# Patient Record
Sex: FEMALE | Race: ASIAN | NOT HISPANIC OR LATINO
[De-identification: names, ages, dates, MRNs, and addresses within clinical notes are randomized per-mention and may not be internally consistent; named-entity substitution may affect disease eponyms.]

---

## 2019-07-23 ENCOUNTER — TRANSCRIPTION ENCOUNTER (OUTPATIENT)
Age: 29
End: 2019-07-23

## 2019-07-30 ENCOUNTER — TRANSCRIPTION ENCOUNTER (OUTPATIENT)
Age: 29
End: 2019-07-30

## 2019-10-30 ENCOUNTER — APPOINTMENT (OUTPATIENT)
Dept: OPHTHALMOLOGY | Facility: CLINIC | Age: 29
End: 2019-10-30
Payer: COMMERCIAL

## 2019-10-30 ENCOUNTER — NON-APPOINTMENT (OUTPATIENT)
Age: 29
End: 2019-10-30

## 2019-10-30 PROCEDURE — 92012 INTRM OPH EXAM EST PATIENT: CPT

## 2019-11-13 ENCOUNTER — APPOINTMENT (OUTPATIENT)
Dept: DERMATOLOGY | Facility: CLINIC | Age: 29
End: 2019-11-13

## 2019-11-13 ENCOUNTER — APPOINTMENT (OUTPATIENT)
Dept: OPHTHALMOLOGY | Facility: CLINIC | Age: 29
End: 2019-11-13

## 2019-11-18 ENCOUNTER — APPOINTMENT (OUTPATIENT)
Dept: DERMATOLOGY | Facility: CLINIC | Age: 29
End: 2019-11-18
Payer: COMMERCIAL

## 2019-11-18 VITALS
DIASTOLIC BLOOD PRESSURE: 60 MMHG | SYSTOLIC BLOOD PRESSURE: 110 MMHG | WEIGHT: 127 LBS | HEIGHT: 61 IN | BODY MASS INDEX: 23.98 KG/M2

## 2019-11-18 DIAGNOSIS — Z77.22 CONTACT WITH AND (SUSPECTED) EXPOSURE TO ENVIRONMENTAL TOBACCO SMOKE (ACUTE) (CHRONIC): ICD-10-CM

## 2019-11-18 DIAGNOSIS — Z87.2 PERSONAL HISTORY OF DISEASES OF THE SKIN AND SUBCUTANEOUS TISSUE: ICD-10-CM

## 2019-11-18 PROCEDURE — 99203 OFFICE O/P NEW LOW 30 MIN: CPT

## 2019-11-25 ENCOUNTER — MEDICATION RENEWAL (OUTPATIENT)
Age: 29
End: 2019-11-25

## 2019-11-26 ENCOUNTER — APPOINTMENT (OUTPATIENT)
Dept: DERMATOLOGY | Facility: CLINIC | Age: 29
End: 2019-11-26

## 2019-12-23 ENCOUNTER — APPOINTMENT (OUTPATIENT)
Dept: DERMATOLOGY | Facility: CLINIC | Age: 29
End: 2019-12-23

## 2020-01-15 ENCOUNTER — TRANSCRIPTION ENCOUNTER (OUTPATIENT)
Age: 30
End: 2020-01-15

## 2020-01-17 ENCOUNTER — APPOINTMENT (OUTPATIENT)
Dept: PEDIATRIC ALLERGY IMMUNOLOGY | Facility: CLINIC | Age: 30
End: 2020-01-17

## 2020-04-14 ENCOUNTER — APPOINTMENT (OUTPATIENT)
Dept: DERMATOLOGY | Facility: CLINIC | Age: 30
End: 2020-04-14
Payer: COMMERCIAL

## 2020-04-14 PROCEDURE — 99214 OFFICE O/P EST MOD 30 MIN: CPT | Mod: 95

## 2020-04-26 ENCOUNTER — MESSAGE (OUTPATIENT)
Age: 30
End: 2020-04-26

## 2020-05-05 ENCOUNTER — APPOINTMENT (OUTPATIENT)
Dept: DERMATOLOGY | Facility: CLINIC | Age: 30
End: 2020-05-05
Payer: COMMERCIAL

## 2020-05-05 DIAGNOSIS — L81.0 POSTINFLAMMATORY HYPERPIGMENTATION: ICD-10-CM

## 2020-05-05 DIAGNOSIS — Z76.89 PERSONS ENCOUNTERING HEALTH SERVICES IN OTHER SPECIFIED CIRCUMSTANCES: ICD-10-CM

## 2020-05-05 PROCEDURE — 99214 OFFICE O/P EST MOD 30 MIN: CPT | Mod: 95

## 2021-11-17 ENCOUNTER — APPOINTMENT (OUTPATIENT)
Dept: DERMATOLOGY | Facility: CLINIC | Age: 31
End: 2021-11-17
Payer: COMMERCIAL

## 2021-11-17 VITALS — BODY MASS INDEX: 24.55 KG/M2 | HEIGHT: 61 IN | WEIGHT: 130 LBS

## 2021-11-17 DIAGNOSIS — L20.9 ATOPIC DERMATITIS, UNSPECIFIED: ICD-10-CM

## 2021-11-17 DIAGNOSIS — K13.0 DISEASES OF LIPS: ICD-10-CM

## 2021-11-17 DIAGNOSIS — L73.0 ACNE KELOID: ICD-10-CM

## 2021-11-17 DIAGNOSIS — L70.0 ACNE VULGARIS: ICD-10-CM

## 2021-11-17 PROCEDURE — 99214 OFFICE O/P EST MOD 30 MIN: CPT

## 2021-12-09 ENCOUNTER — RESULT REVIEW (OUTPATIENT)
Age: 31
End: 2021-12-09

## 2022-09-30 ENCOUNTER — APPOINTMENT (OUTPATIENT)
Dept: DERMATOLOGY | Facility: CLINIC | Age: 32
End: 2022-09-30

## 2023-06-09 ENCOUNTER — APPOINTMENT (OUTPATIENT)
Dept: ANTEPARTUM | Facility: CLINIC | Age: 33
End: 2023-06-09
Payer: COMMERCIAL

## 2023-06-09 ENCOUNTER — ASOB RESULT (OUTPATIENT)
Age: 33
End: 2023-06-09

## 2023-06-09 PROCEDURE — 76811 OB US DETAILED SNGL FETUS: CPT

## 2023-10-22 ENCOUNTER — INPATIENT (INPATIENT)
Facility: HOSPITAL | Age: 33
LOS: 2 days | Discharge: HOME | End: 2023-10-25
Attending: OBSTETRICS & GYNECOLOGY | Admitting: OBSTETRICS & GYNECOLOGY
Payer: COMMERCIAL

## 2023-10-22 VITALS — HEART RATE: 87 BPM | OXYGEN SATURATION: 96 %

## 2023-10-22 DIAGNOSIS — O26.899 OTHER SPECIFIED PREGNANCY RELATED CONDITIONS, UNSPECIFIED TRIMESTER: ICD-10-CM

## 2023-10-22 DIAGNOSIS — Z3A.40 40 WEEKS GESTATION OF PREGNANCY: ICD-10-CM

## 2023-10-22 DIAGNOSIS — Z34.80 ENCOUNTER FOR SUPERVISION OF OTHER NORMAL PREGNANCY, UNSPECIFIED TRIMESTER: ICD-10-CM

## 2023-10-22 LAB
BASOPHILS # BLD AUTO: 0.02 K/UL — SIGNIFICANT CHANGE UP (ref 0–0.2)
BASOPHILS # BLD AUTO: 0.02 K/UL — SIGNIFICANT CHANGE UP (ref 0–0.2)
BASOPHILS NFR BLD AUTO: 0.2 % — SIGNIFICANT CHANGE UP (ref 0–2)
BASOPHILS NFR BLD AUTO: 0.2 % — SIGNIFICANT CHANGE UP (ref 0–2)
BLD GP AB SCN SERPL QL: NEGATIVE — SIGNIFICANT CHANGE UP
BLD GP AB SCN SERPL QL: NEGATIVE — SIGNIFICANT CHANGE UP
EOSINOPHIL # BLD AUTO: 0.17 K/UL — SIGNIFICANT CHANGE UP (ref 0–0.5)
EOSINOPHIL # BLD AUTO: 0.17 K/UL — SIGNIFICANT CHANGE UP (ref 0–0.5)
EOSINOPHIL NFR BLD AUTO: 1.9 % — SIGNIFICANT CHANGE UP (ref 0–6)
EOSINOPHIL NFR BLD AUTO: 1.9 % — SIGNIFICANT CHANGE UP (ref 0–6)
HCT VFR BLD CALC: 36.6 % — SIGNIFICANT CHANGE UP (ref 34.5–45)
HCT VFR BLD CALC: 36.6 % — SIGNIFICANT CHANGE UP (ref 34.5–45)
HGB BLD-MCNC: 12.5 G/DL — SIGNIFICANT CHANGE UP (ref 11.5–15.5)
HGB BLD-MCNC: 12.5 G/DL — SIGNIFICANT CHANGE UP (ref 11.5–15.5)
IMM GRANULOCYTES NFR BLD AUTO: 0.8 % — SIGNIFICANT CHANGE UP (ref 0–0.9)
IMM GRANULOCYTES NFR BLD AUTO: 0.8 % — SIGNIFICANT CHANGE UP (ref 0–0.9)
LYMPHOCYTES # BLD AUTO: 1.82 K/UL — SIGNIFICANT CHANGE UP (ref 1–3.3)
LYMPHOCYTES # BLD AUTO: 1.82 K/UL — SIGNIFICANT CHANGE UP (ref 1–3.3)
LYMPHOCYTES # BLD AUTO: 20.5 % — SIGNIFICANT CHANGE UP (ref 13–44)
LYMPHOCYTES # BLD AUTO: 20.5 % — SIGNIFICANT CHANGE UP (ref 13–44)
MCHC RBC-ENTMCNC: 28.7 PG — SIGNIFICANT CHANGE UP (ref 27–34)
MCHC RBC-ENTMCNC: 28.7 PG — SIGNIFICANT CHANGE UP (ref 27–34)
MCHC RBC-ENTMCNC: 34.2 GM/DL — SIGNIFICANT CHANGE UP (ref 32–36)
MCHC RBC-ENTMCNC: 34.2 GM/DL — SIGNIFICANT CHANGE UP (ref 32–36)
MCV RBC AUTO: 83.9 FL — SIGNIFICANT CHANGE UP (ref 80–100)
MCV RBC AUTO: 83.9 FL — SIGNIFICANT CHANGE UP (ref 80–100)
MONOCYTES # BLD AUTO: 0.74 K/UL — SIGNIFICANT CHANGE UP (ref 0–0.9)
MONOCYTES # BLD AUTO: 0.74 K/UL — SIGNIFICANT CHANGE UP (ref 0–0.9)
MONOCYTES NFR BLD AUTO: 8.4 % — SIGNIFICANT CHANGE UP (ref 2–14)
MONOCYTES NFR BLD AUTO: 8.4 % — SIGNIFICANT CHANGE UP (ref 2–14)
NEUTROPHILS # BLD AUTO: 6.04 K/UL — SIGNIFICANT CHANGE UP (ref 1.8–7.4)
NEUTROPHILS # BLD AUTO: 6.04 K/UL — SIGNIFICANT CHANGE UP (ref 1.8–7.4)
NEUTROPHILS NFR BLD AUTO: 68.2 % — SIGNIFICANT CHANGE UP (ref 43–77)
NEUTROPHILS NFR BLD AUTO: 68.2 % — SIGNIFICANT CHANGE UP (ref 43–77)
NRBC # BLD: 0 /100 WBCS — SIGNIFICANT CHANGE UP (ref 0–0)
NRBC # BLD: 0 /100 WBCS — SIGNIFICANT CHANGE UP (ref 0–0)
PLATELET # BLD AUTO: 213 K/UL — SIGNIFICANT CHANGE UP (ref 150–400)
PLATELET # BLD AUTO: 213 K/UL — SIGNIFICANT CHANGE UP (ref 150–400)
RBC # BLD: 4.36 M/UL — SIGNIFICANT CHANGE UP (ref 3.8–5.2)
RBC # BLD: 4.36 M/UL — SIGNIFICANT CHANGE UP (ref 3.8–5.2)
RBC # FLD: 14.6 % — HIGH (ref 10.3–14.5)
RBC # FLD: 14.6 % — HIGH (ref 10.3–14.5)
RH IG SCN BLD-IMP: POSITIVE — SIGNIFICANT CHANGE UP
RH IG SCN BLD-IMP: POSITIVE — SIGNIFICANT CHANGE UP
WBC # BLD: 8.86 K/UL — SIGNIFICANT CHANGE UP (ref 3.8–10.5)
WBC # BLD: 8.86 K/UL — SIGNIFICANT CHANGE UP (ref 3.8–10.5)
WBC # FLD AUTO: 8.86 K/UL — SIGNIFICANT CHANGE UP (ref 3.8–10.5)
WBC # FLD AUTO: 8.86 K/UL — SIGNIFICANT CHANGE UP (ref 3.8–10.5)

## 2023-10-22 RX ORDER — OXYTOCIN 10 UNIT/ML
4 VIAL (ML) INJECTION
Qty: 30 | Refills: 0 | Status: DISCONTINUED | OUTPATIENT
Start: 2023-10-22 | End: 2023-10-23

## 2023-10-22 RX ORDER — CHLORHEXIDINE GLUCONATE 213 G/1000ML
1 SOLUTION TOPICAL DAILY
Refills: 0 | Status: DISCONTINUED | OUTPATIENT
Start: 2023-10-22 | End: 2023-10-23

## 2023-10-22 RX ORDER — AMPICILLIN TRIHYDRATE 250 MG
2 CAPSULE ORAL ONCE
Refills: 0 | Status: COMPLETED | OUTPATIENT
Start: 2023-10-22 | End: 2023-10-22

## 2023-10-22 RX ORDER — CITRIC ACID/SODIUM CITRATE 300-500 MG
15 SOLUTION, ORAL ORAL EVERY 6 HOURS
Refills: 0 | Status: DISCONTINUED | OUTPATIENT
Start: 2023-10-22 | End: 2023-10-23

## 2023-10-22 RX ORDER — SODIUM CHLORIDE 9 MG/ML
1000 INJECTION, SOLUTION INTRAVENOUS
Refills: 0 | Status: DISCONTINUED | OUTPATIENT
Start: 2023-10-22 | End: 2023-10-23

## 2023-10-22 RX ORDER — AMPICILLIN TRIHYDRATE 250 MG
1 CAPSULE ORAL EVERY 4 HOURS
Refills: 0 | Status: DISCONTINUED | OUTPATIENT
Start: 2023-10-22 | End: 2023-10-23

## 2023-10-22 RX ORDER — OXYTOCIN 10 UNIT/ML
333.33 VIAL (ML) INJECTION
Qty: 20 | Refills: 0 | Status: DISCONTINUED | OUTPATIENT
Start: 2023-10-22 | End: 2023-10-23

## 2023-10-22 RX ADMIN — Medication 4 MILLIUNIT(S)/MIN: at 22:18

## 2023-10-22 RX ADMIN — Medication 200 GRAM(S): at 21:12

## 2023-10-22 NOTE — OB RN PATIENT PROFILE - FALL HARM RISK - UNIVERSAL INTERVENTIONS
Bed in lowest position, wheels locked, appropriate side rails in place/Call bell, personal items and telephone in reach/Instruct patient to call for assistance before getting out of bed or chair/Non-slip footwear when patient is out of bed/[place] to call system/Physically safe environment - no spills, clutter or unnecessary equipment/Purposeful Proactive Rounding/Room/bathroom lighting operational, light cord in reach

## 2023-10-22 NOTE — OB PROVIDER H&P - HISTORY OF PRESENT ILLNESS
34yo P 1001    @   40w 0 d    presents c/o LOF at , c/o mild contractions, denies VB has + FM      PNC: Dr Santos  PNI: uncomplicated  PNL: GBS negative      All: No Known Allergies  Meds: ASA, PNV  PMHx: Denies  PSHx: Denies  Socialhx: Denies x 3, Denies anxiety or depression  OBhx: 2018  FT    uncomplicated 4lbs 12oz  GYNhx: Denies fibroids ov cysts or STDs or abnormal pap smears    T(C): 36.6 (10-22-23 @ 19:47), Max: 36.6 (10-22-23 @ 19:47)  HR: 88 (10--23 @ 19:54) (74 - 88)  BP: 119/58 (10-22-23 @ 19:47) (119/58 - 119/58)  RR: 18 (10-22-23 @ 19:47) (18 - 18)  SpO2: 93% (10-22-23 @ 19:54) (93% - 98%)    Gen: NAD  Heart: RRR  Lungs: CTA B/L  Abdomen: Gravid, NT  Ext: no calf tenderness    NST:  TOCO:  VE:   EFW: 3400  BSUS:    A/P 34yo    @ 40w 0 d  admitted for  - Admit to L & D/labs/IVF/NPO  - Fetal status -   - GBS   - Pain control -   - Labor managment-  - Maternal Status -   - Covid swab ordered  - Consents to be signed  D/W    Jovanna Santillan PA-C     34yo P 1001  @  40w 0 d   presents c/o LOF at 5p , c/o mild contractions, denies VB has + FM    PNC: Dr Santos  PNI: uncomplicated  PNL: GBS negative      All: No Known Allergies  Meds: ASA, PNV  PMHx: Denies  PSHx: Denies  Socialhx: Denies x 3, Denies anxiety or depression  OBhx: 2018  FT    uncomplicated 4lbs 12oz  GYNhx: Denies fibroids, ov cysts or STDs or abnormal pap smears    T(C): 36.6 (10-22-23 @ 19:47), Max: 36.6 (10-22-23 @ 19:47)  HR: 88 (10-22-23 @ 19:54) (74 - 88)  BP: 119/58 (10-22-23 @ 19:47) (119/58 - 119/58)  RR: 18 (10-22-23 @ 19:47) (18 - 18)  SpO2: 93% (10-22-23 @ 19:54) (93% - 98%)    Gen: NAD  Heart: RRR  Lungs: CTA B/L  Abdomen: Gravid, NT  Ext: no calf tenderness    NST: 150's moderate variability + accels + questionable decel when pt was first placed on the monitor  TOCO:occasional  VE: /-3, grossly ruptured - clear,  nitrazine +  EFW: 3400  BSUS: vtx         32yo P 1001  @  40w 0 d   presents c/o LOF at 5p , c/o mild contractions, denies VB has + FM    PNC: Dr Santos  PNI: uncomplicated  PNL: GBS positive      All: No Known Allergies  Meds: ASA, PNV  PMHx: Denies  PSHx: Denies  Socialhx: Denies x 3, Denies anxiety or depression  OBhx: 2018  FT    uncomplicated 4lbs 12oz  GYNhx: Denies fibroids, ov cysts or STDs or abnormal pap smears    T(C): 36.6 (10-22-23 @ 19:47), Max: 36.6 (10-22-23 @ 19:47)  HR: 88 (10-22-23 @ 19:54) (74 - 88)  BP: 119/58 (10-22-23 @ 19:47) (119/58 - 119/58)  RR: 18 (10-22-23 @ 19:47) (18 - 18)  SpO2: 93% (10-22-23 @ 19:54) (93% - 98%)    Gen: NAD  Heart: RRR  Lungs: CTA B/L  Abdomen: Gravid, NT  Ext: no calf tenderness    NST: 150's moderate variability + accels + questionable decel when pt was first placed on the monitor  TOCO:occasional  VE: /-3, grossly ruptured - clear,  nitrazine +  EFW: 3400  BSUS: vtx

## 2023-10-22 NOTE — OB PROVIDER H&P - NSICDXNOPASTSURGICALHX_GEN_ALL_CORE
EMMA Bolanos Nurse Msg Pool  [facility] PER WEB STATES THAT CPT CODES 37498 AND 18175 HAVE NOT MET CRITERIA AND HAVE BEEN DENIED. A PEER TO PEER CAN BE SCHEDULED BY CALLING [phone] USING REF # [id number] OR AN IN HOME STUDY CAN BE ORDERED.     Please let me know what the provider decides to do.     Thank you,   Gabrielle/Diego      <-- Click to add NO significant Past Surgical History

## 2023-10-22 NOTE — PRE-ANESTHESIA EVALUATION ADULT - NSANTHPMHFT_GEN_ALL_CORE
34 yo  @ 40w 0 d  admitted for prom for IOL, now in labor. Requesting an epidural for labor analgesia.    Denies active CP/SOB/Orthopnea/easy bleeding/easy bruising

## 2023-10-22 NOTE — OB PROVIDER H&P - NSLOWPPHRISK_OBGYN_A_OB
No previous uterine incision/Alex Pregnancy/Less than or equal to 4 previous vaginal births/No known bleeding disorder/No history of postpartum hemorrhage

## 2023-10-22 NOTE — OB PROVIDER H&P - NS ATTEND AMEND GEN_ALL_CORE FT
Patient seen. P1 here PROM. GBS positive. Uncomplicated pregnancy. Now comfortable with epidural    Cat I tracing   TOCO: 2 in ten     EFW 7-11lb    Plan:  As above  Continue pitocin  Consents signed. Risks reviewed for operative, vaginal and  section.   Consents signed for circumcision. Risks reviewed including bleeding infection, damage to penis, need for future surgery.   Anticipate     Sarah Jackson DO

## 2023-10-22 NOTE — OB PROVIDER H&P - PROBLEM SELECTOR PLAN 1
- Admit to L & D/labs/IVF/clears  - Fetal status - NST/TOCO  - GBS negative  - Pain control - as needed  - Labor management- will start with pitocin augmentation  - Consents to be signed  D/W Dr Renetta Santillan PA-C - Admit to L & D/labs/IVF/clears  - Fetal status - NST/TOCO  - GBS positive --> ampicillin prophylaxis  - Pain control - as needed  - Labor management- will start with pitocin augmentation  - Consents to be signed  D/W Dr Renetta Santillan PA-C

## 2023-10-23 ENCOUNTER — TRANSCRIPTION ENCOUNTER (OUTPATIENT)
Age: 33
End: 2023-10-23

## 2023-10-23 LAB
T PALLIDUM AB TITR SER: NEGATIVE — SIGNIFICANT CHANGE UP
T PALLIDUM AB TITR SER: NEGATIVE — SIGNIFICANT CHANGE UP

## 2023-10-23 RX ORDER — BENZOCAINE 10 %
1 GEL (GRAM) MUCOUS MEMBRANE EVERY 6 HOURS
Refills: 0 | Status: DISCONTINUED | OUTPATIENT
Start: 2023-10-23 | End: 2023-10-25

## 2023-10-23 RX ORDER — PRAMOXINE HYDROCHLORIDE 150 MG/15G
1 AEROSOL, FOAM RECTAL EVERY 4 HOURS
Refills: 0 | Status: DISCONTINUED | OUTPATIENT
Start: 2023-10-23 | End: 2023-10-25

## 2023-10-23 RX ORDER — OXYTOCIN 10 UNIT/ML
41.67 VIAL (ML) INJECTION
Qty: 20 | Refills: 0 | Status: DISCONTINUED | OUTPATIENT
Start: 2023-10-23 | End: 2023-10-25

## 2023-10-23 RX ORDER — ACETAMINOPHEN 500 MG
975 TABLET ORAL
Refills: 0 | Status: DISCONTINUED | OUTPATIENT
Start: 2023-10-23 | End: 2023-10-25

## 2023-10-23 RX ORDER — SODIUM CHLORIDE 9 MG/ML
500 INJECTION, SOLUTION INTRAVENOUS ONCE
Refills: 0 | Status: COMPLETED | OUTPATIENT
Start: 2023-10-23 | End: 2023-10-23

## 2023-10-23 RX ORDER — SIMETHICONE 80 MG/1
80 TABLET, CHEWABLE ORAL EVERY 4 HOURS
Refills: 0 | Status: DISCONTINUED | OUTPATIENT
Start: 2023-10-23 | End: 2023-10-25

## 2023-10-23 RX ORDER — OXYTOCIN 10 UNIT/ML
1 VIAL (ML) INJECTION
Qty: 30 | Refills: 0 | Status: DISCONTINUED | OUTPATIENT
Start: 2023-10-23 | End: 2023-10-23

## 2023-10-23 RX ORDER — IBUPROFEN 200 MG
600 TABLET ORAL EVERY 6 HOURS
Refills: 0 | Status: COMPLETED | OUTPATIENT
Start: 2023-10-23 | End: 2024-09-20

## 2023-10-23 RX ORDER — KETOROLAC TROMETHAMINE 30 MG/ML
30 SYRINGE (ML) INJECTION ONCE
Refills: 0 | Status: DISCONTINUED | OUTPATIENT
Start: 2023-10-23 | End: 2023-10-25

## 2023-10-23 RX ORDER — IBUPROFEN 200 MG
600 TABLET ORAL EVERY 6 HOURS
Refills: 0 | Status: DISCONTINUED | OUTPATIENT
Start: 2023-10-23 | End: 2023-10-25

## 2023-10-23 RX ORDER — AER TRAVELER 0.5 G/1
1 SOLUTION RECTAL; TOPICAL EVERY 4 HOURS
Refills: 0 | Status: DISCONTINUED | OUTPATIENT
Start: 2023-10-23 | End: 2023-10-25

## 2023-10-23 RX ORDER — OXYCODONE HYDROCHLORIDE 5 MG/1
5 TABLET ORAL ONCE
Refills: 0 | Status: DISCONTINUED | OUTPATIENT
Start: 2023-10-23 | End: 2023-10-25

## 2023-10-23 RX ORDER — OXYCODONE HYDROCHLORIDE 5 MG/1
5 TABLET ORAL
Refills: 0 | Status: DISCONTINUED | OUTPATIENT
Start: 2023-10-23 | End: 2023-10-25

## 2023-10-23 RX ORDER — SODIUM CHLORIDE 9 MG/ML
3 INJECTION INTRAMUSCULAR; INTRAVENOUS; SUBCUTANEOUS EVERY 8 HOURS
Refills: 0 | Status: DISCONTINUED | OUTPATIENT
Start: 2023-10-23 | End: 2023-10-25

## 2023-10-23 RX ORDER — OXYTOCIN 10 UNIT/ML
VIAL (ML) INJECTION
Qty: 30 | Refills: 0 | Status: DISCONTINUED | OUTPATIENT
Start: 2023-10-23 | End: 2023-10-23

## 2023-10-23 RX ORDER — MAGNESIUM HYDROXIDE 400 MG/1
30 TABLET, CHEWABLE ORAL
Refills: 0 | Status: DISCONTINUED | OUTPATIENT
Start: 2023-10-23 | End: 2023-10-25

## 2023-10-23 RX ORDER — HYDROCORTISONE 1 %
1 OINTMENT (GRAM) TOPICAL EVERY 6 HOURS
Refills: 0 | Status: DISCONTINUED | OUTPATIENT
Start: 2023-10-23 | End: 2023-10-25

## 2023-10-23 RX ORDER — DIPHENHYDRAMINE HCL 50 MG
25 CAPSULE ORAL EVERY 6 HOURS
Refills: 0 | Status: DISCONTINUED | OUTPATIENT
Start: 2023-10-23 | End: 2023-10-25

## 2023-10-23 RX ORDER — LANOLIN
1 OINTMENT (GRAM) TOPICAL EVERY 6 HOURS
Refills: 0 | Status: DISCONTINUED | OUTPATIENT
Start: 2023-10-23 | End: 2023-10-25

## 2023-10-23 RX ORDER — OXYTOCIN 10 UNIT/ML
2 VIAL (ML) INJECTION
Qty: 30 | Refills: 0 | Status: DISCONTINUED | OUTPATIENT
Start: 2023-10-23 | End: 2023-10-23

## 2023-10-23 RX ORDER — TETANUS TOXOID, REDUCED DIPHTHERIA TOXOID AND ACELLULAR PERTUSSIS VACCINE, ADSORBED 5; 2.5; 8; 8; 2.5 [IU]/.5ML; [IU]/.5ML; UG/.5ML; UG/.5ML; UG/.5ML
0.5 SUSPENSION INTRAMUSCULAR ONCE
Refills: 0 | Status: DISCONTINUED | OUTPATIENT
Start: 2023-10-23 | End: 2023-10-25

## 2023-10-23 RX ORDER — DIBUCAINE 1 %
1 OINTMENT (GRAM) RECTAL EVERY 6 HOURS
Refills: 0 | Status: DISCONTINUED | OUTPATIENT
Start: 2023-10-23 | End: 2023-10-25

## 2023-10-23 RX ADMIN — SODIUM CHLORIDE 500 MILLILITER(S): 9 INJECTION, SOLUTION INTRAVENOUS at 00:29

## 2023-10-23 RX ADMIN — SODIUM CHLORIDE 1000 MILLILITER(S): 9 INJECTION, SOLUTION INTRAVENOUS at 06:20

## 2023-10-23 RX ADMIN — Medication 975 MILLIGRAM(S): at 18:36

## 2023-10-23 RX ADMIN — Medication 600 MILLIGRAM(S): at 22:00

## 2023-10-23 RX ADMIN — Medication 108 GRAM(S): at 09:55

## 2023-10-23 RX ADMIN — Medication 975 MILLIGRAM(S): at 18:05

## 2023-10-23 RX ADMIN — Medication 600 MILLIGRAM(S): at 21:01

## 2023-10-23 RX ADMIN — Medication 2 MILLIUNIT(S)/MIN: at 01:20

## 2023-10-23 RX ADMIN — Medication 0.25 MILLIGRAM(S): at 00:33

## 2023-10-23 RX ADMIN — Medication 108 GRAM(S): at 05:34

## 2023-10-23 RX ADMIN — Medication 1 TABLET(S): at 21:01

## 2023-10-23 RX ADMIN — Medication 108 GRAM(S): at 01:17

## 2023-10-23 NOTE — DISCHARGE NOTE OB - CARE PROVIDER_API CALL
Helen Leon  Obstetrics and Gynecology  [address], [address]  [place] [address]  Phone: [phone]  Fax: [phone]  Follow Up Time:

## 2023-10-23 NOTE — OB PROVIDER DELIVERY SUMMARY - NSPROVIDERDELIVERYNOTE_OBGYN_ALL_OB_FT
Pt pushed with good effort. Delivered fetal head and then shoulder dystocia lasting slightly over 1 minute. Suprapubic and andree done first and then posterior arm was delivered and the rest of the body delivered. Peds already at delivery due to tracing while pushing. Placenta delivered shortly afterwards spontaneously and intact. 3rd degree laceration noted and mm repaired with 2-0 vicryl and then remaining laceration repaired with 2-0 chromic in usual fashion. Bilateral labial laceration repaired with 3-0 chromic in interrupted fashion.     Helen Leon, DO Pt pushed with good effort. Delivered fetal head and then shoulder dystocia lasting slightly over 1 minute. Suprapubic and andree done first and then posterior arm was delivered and the rest of the body delivered. Peds already at delivery due to tracing while pushing. Placenta delivered shortly afterwards spontaneously and intact. 3rd degree laceration noted and mm repaired with 2-0 vicryl and then remaining laceration repaired with 2-0 chromic in usual fashion. Bilateral labial laceration repaired with 3-0 chromic in interrupted fashion. Rectal exam performed at conclusion and no sutures palpated.     Helen Paniccia, DO

## 2023-10-23 NOTE — DISCHARGE NOTE OB - HOSPITAL COURSE
Pt was admitted for IOL d/t PROM. She had vaginal delivery complicated by shoulder dystocia. Postpartum course was uncomplicated and pt discharged on PPD Pt was admitted for IOL d/t PROM. She had vaginal delivery complicated by shoulder dystocia. Postpartum course was uncomplicated and pt discharged on PPD 2

## 2023-10-23 NOTE — OB PROVIDER DELIVERY SUMMARY - DELIVERY COMPLICATIONS; SHOULDER DYSTOCIA
Pt pushed with good effort. RML performed. Delivered fetal head and then shoulder dystocia lasting slightly over 1 minute. Suprapubic and andree done first and then posterior arm was delivered and the rest of the body delivered. Peds already at delivery due to tracing while pushing.

## 2023-10-23 NOTE — OB PROVIDER LABOR PROGRESS NOTE - NS_OBIHIFHRDETAILS_OBGYN_ALL_OB_FT
cat 1
140's moderate variability + accels + prolonged decel x 7 min to 90's, with recovery after pitocin stopped and terb given
150 minimal variability, -acels, + prolonged deceleration/ late decelerations, category 2 tracing
FHR: 150s, Mod-min variability, +accel, + intermittent late decel  TOCO: Q2-4min

## 2023-10-23 NOTE — DISCHARGE NOTE OB - PATIENT PORTAL LINK FT
You can access the FollowMyHealth Patient Portal offered by [facility] by registering at the following website: [email or web address]. By joining [facility]’s FollowMyHealth portal, you will also be able to view your health information using other applications (apps) compatible with our system.

## 2023-10-23 NOTE — DISCHARGE NOTE OB - CARE PLAN
Principal Discharge DX:	Vaginal delivery  Assessment and plan of treatment:	routine postpartum care   1 Principal Discharge DX:	Vaginal delivery  Assessment and plan of treatment:	After discharge, please stay on pelvic rest for 6 weeks, meaning no sexual intercourse, no tampons and no douching.  No driving for 2 weeks as women can loose a lot of blood during delivery and there is a possibility of being lightheaded/fainting.  No lifting objects heavier than baby for two weeks.  Expect to have vaginal bleeding/spotting for up to six weeks.  The bleeding should get lighter and more white/light brown with time.  For bleeding soaking more than a pad an hour or passing clots greater than the size of your fist, come in to the emergency department.

## 2023-10-23 NOTE — DISCHARGE NOTE OB - MEDICATION SUMMARY - MEDICATIONS TO TAKE
I will START or STAY ON the medications listed below when I get home from the hospital:    ibuprofen 600 mg oral tablet  -- 1 tab(s) by mouth every 6 hours  -- Indication: For pain    acetaminophen 325 mg oral tablet  -- 3 tab(s) by mouth every 8 hours  -- Indication: For pain    Prenatal Multivitamins with Folic Acid 1 mg oral tablet  -- 1 tab(s) by mouth once a day  -- Indication: For wellness

## 2023-10-23 NOTE — OB RN DELIVERY SUMMARY - NSSELHIDDEN_OBGYN_ALL_OB_FT
[NS_DeliveryAttending1_OBGYN_ALL_OB_FT:[id number]=],[NS_DeliveryAssist1_OBGYN_ALL_OB_FT:[id number]=],[NS_DeliveryRN_OBGYN_ALL_OB_FT:[id number]=]

## 2023-10-23 NOTE — PROVIDER CONTACT NOTE (OTHER) - SITUATION
patient complaining of cramping and notice an are protruding out above umbilicus. looks like it could be a hernia

## 2023-10-23 NOTE — DISCHARGE NOTE OB - NS MD DC FALL RISK RISK
For information on Fall & Injury Prevention, visit: [email or web address] OR  [email or web address] OR  https://www.cdc.gov/steadi/patient.html

## 2023-10-23 NOTE — PROVIDER CONTACT NOTE (OTHER) - ASSESSMENT
fundus is firm bleeding is light. Area protruding out above umbilicus is soft. patient has no pain while palpating.

## 2023-10-23 NOTE — OB NEONATOLOGY/PEDIATRICIAN DELIVERY SUMMARY - NSPEDSNEONOTESA_OBGYN_ALL_OB_FT
Requested by OB to attend this vaginal delivery at 40 weeks for NRFT.  Mother is a 33 year old, , blood type O+.  Prenatal labs as follow: HIV neg, RPR non-reactive, rubella immune, HBsA neg, GBS pos on 23. Received 4 doses of ampicillin prior to delivery. No significant maternal history. This pregnancy was uncomplicated.  ROM 10/22/23 ROM at 1700 (19hrs prior to delivery).  Clear fluids. Vertex presentation.  Code 100 reported by OB for shoulder precautions. Emerged with good tone, cried after stimulation. Dried, suctioned and stimulated on warmer. Apgars 8&9. Mom wishes to breast/bottle feed.   Consents to Hep B vaccine. Consents to circumcision. [facility] to [facility] for routine care. Parents updated. Physical exam grossly WNL.

## 2023-10-23 NOTE — OB PROVIDER DELIVERY SUMMARY - NSSELHIDDEN_OBGYN_ALL_OB_FT
[NS_DeliveryAttending1_OBGYN_ALL_OB_FT:[id number]=],[NS_DeliveryAssist1_OBGYN_ALL_OB_FT:[id number]=]

## 2023-10-23 NOTE — OB RN DELIVERY SUMMARY - NS_SEPSISRSKCALC_OBGYN_ALL_OB_FT
EOS calculated successfully. EOS Risk Factor: 0.5/1000 live births ([facility] national incidence); GA=40w1d; Temp=99.86; ROM=19.55; GBS='Positive'; Antibiotics='Broad spectrum antibiotics > 4 hrs prior to birth'

## 2023-10-23 NOTE — OB PROVIDER LABOR PROGRESS NOTE - ASSESSMENT
32yo IOL for PROM    Plan:  -continue mgmt  - EFM/TOCO    Mariaelena Bustos PGY1
Inc pitocin to 2mU and as needed so mary jo closer together provided fetus tolerates. Reviewed with pt need for ctx to be closer together. Previously fetus did not tolerate pitocin. Reviewed going slowly to try and prevent fetal distress. Reviewed however that need ctx to descend and push. Pit also placed in high fowlers.     Helen Leon, DO
32 y/o  @ 40.1 weeks admitted with PROM@5p  -Prolonged decelerations: terbutaline .25 mg, IV hydration, pitocin discontinued    Dr. Leon at the bedside  Mary Soriano NP
32yo  @ 40w 1 d admitted for IOL secondary to prom  - fetal status- cat 2--> changed position, pitocin stopped, terb x 1 and anesthesia called to low BP  - epidural in place  - continue ampicillin  pt seen at bedside with Dr Renetta Santillan PA-C

## 2023-10-24 RX ADMIN — Medication 975 MILLIGRAM(S): at 06:41

## 2023-10-24 RX ADMIN — Medication 600 MILLIGRAM(S): at 03:09

## 2023-10-24 RX ADMIN — Medication 600 MILLIGRAM(S): at 20:29

## 2023-10-24 RX ADMIN — Medication 1 TABLET(S): at 12:12

## 2023-10-24 RX ADMIN — Medication 600 MILLIGRAM(S): at 16:30

## 2023-10-24 RX ADMIN — Medication 975 MILLIGRAM(S): at 00:22

## 2023-10-24 RX ADMIN — Medication 600 MILLIGRAM(S): at 09:46

## 2023-10-24 RX ADMIN — Medication 975 MILLIGRAM(S): at 01:15

## 2023-10-24 RX ADMIN — Medication 600 MILLIGRAM(S): at 20:59

## 2023-10-24 RX ADMIN — Medication 600 MILLIGRAM(S): at 15:34

## 2023-10-24 RX ADMIN — Medication 600 MILLIGRAM(S): at 08:57

## 2023-10-24 RX ADMIN — Medication 600 MILLIGRAM(S): at 04:00

## 2023-10-24 NOTE — PROGRESS NOTE ADULT - ATTENDING COMMENTS
Pt seen on AM rounds and note reviewed    VSS AF  Fundus U-1  ext: no cords, no edema  A/P: PPD 2 s/p   D/C home  Mark Massey MD

## 2023-10-24 NOTE — PROGRESS NOTE ADULT - SUBJECTIVE AND OBJECTIVE BOX
Postpartum Note- PPD#1    Blood Type: O Positive          RPR : Negative  Rubella: Immune     S: Patient is a 34 yo  PPD#1 s/p   Patient w/o complaints, pain is controlled.    Pt is OOB, tolerating PO, passing flatus. Lochia WNL.     O:  Vital Signs Last 24 Hrs  T(C): 36.8 (24 Oct 2023 06:05), Max: 37.7 (23 Oct 2023 11:24)  T(F): 98.3 (24 Oct 2023 06:05), Max: 99.86 (23 Oct 2023 11:24)  HR: 86 (24 Oct 2023 06:05) (83 - 178)  BP: 98/65 (24 Oct 2023 06:05) (97/54 - 131/86)  BP(mean): --  RR: 18 (24 Oct 2023 06:05) (15 - 18)  SpO2: 97% (24 Oct 2023 06:05) (73% - 99%)    Parameters below as of 24 Oct 2023 06:05  Patient On (Oxygen Delivery Method): room air    Gen: NAD  Abdomen: Soft, nontender, non-distended, fundus firm.  Vaginal: Lochia WNL  Ext: Neg calf tenderness    LABS:  Hemoglobin: 12.5 g/dL (10-22 @ 21:15)  Hematocrit: 36.6 % (10- @ 21:15)

## 2023-10-25 VITALS
HEART RATE: 92 BPM | DIASTOLIC BLOOD PRESSURE: 77 MMHG | TEMPERATURE: 98 F | OXYGEN SATURATION: 98 % | SYSTOLIC BLOOD PRESSURE: 133 MMHG | RESPIRATION RATE: 18 BRPM

## 2023-10-25 PROCEDURE — 86780 TREPONEMA PALLIDUM: CPT

## 2023-10-25 PROCEDURE — 85025 COMPLETE CBC W/AUTO DIFF WBC: CPT

## 2023-10-25 PROCEDURE — 86900 BLOOD TYPING SEROLOGIC ABO: CPT

## 2023-10-25 PROCEDURE — 59050 FETAL MONITOR W/REPORT: CPT

## 2023-10-25 PROCEDURE — 86901 BLOOD TYPING SEROLOGIC RH(D): CPT

## 2023-10-25 PROCEDURE — 86850 RBC ANTIBODY SCREEN: CPT

## 2023-10-25 RX ORDER — ACETAMINOPHEN 500 MG
3 TABLET ORAL
Qty: 0 | Refills: 0 | DISCHARGE
Start: 2023-10-25

## 2023-10-25 RX ORDER — IBUPROFEN 200 MG
1 TABLET ORAL
Qty: 0 | Refills: 0 | DISCHARGE
Start: 2023-10-25

## 2023-10-25 RX ADMIN — Medication 600 MILLIGRAM(S): at 08:30

## 2023-10-25 RX ADMIN — Medication 600 MILLIGRAM(S): at 08:00

## 2023-10-25 RX ADMIN — Medication 975 MILLIGRAM(S): at 12:50

## 2023-10-25 NOTE — PROGRESS NOTE ADULT - SUBJECTIVE AND OBJECTIVE BOX
OB Attending Note    Seen at 7:50am    S: Patient doing well. Minimal lochia. Pain c/o back and perineal pain did not take meds overnight. Yesterday had good pain control with meds.    O: Vital Signs Last 24 Hrs  T(C): 36.8 (25 Oct 2023 05:54), Max: 36.9 (24 Oct 2023 16:31)  T(F): 98.2 (25 Oct 2023 05:54), Max: 98.4 (24 Oct 2023 16:31)  HR: 92 (25 Oct 2023 05:54) (92 - 92)  BP: 133/77 (25 Oct 2023 05:54) (100/62 - 133/77)  BP(mean): --  RR: 18 (25 Oct 2023 05:54) (18 - 18)  SpO2: 98% (25 Oct 2023 05:54) (95% - 98%)    Parameters below as of 25 Oct 2023 05:54  Patient On (Oxygen Delivery Method): room air        Gen: NAD  Abd: soft, NT, ND, fundus firm below umbilicus  Lochia: min  Perineum healing well  Ext: no tenderness    Labs:

## 2023-10-25 NOTE — PROGRESS NOTE ADULT - ASSESSMENT
PPD2 s/p , doing well   -Discharge home - IF PAIN IMPROVED AFTER MEDS  -Precautions given   -Follow-up in office in 6 weeks      Ashli Medina MD

## 2024-01-22 ENCOUNTER — NON-APPOINTMENT (OUTPATIENT)
Age: 34
End: 2024-01-22

## 2024-02-01 ENCOUNTER — APPOINTMENT (OUTPATIENT)
Dept: ULTRASOUND IMAGING | Facility: CLINIC | Age: 34
End: 2024-02-01
Payer: COMMERCIAL

## 2024-02-01 PROCEDURE — 76705 ECHO EXAM OF ABDOMEN: CPT

## 2024-02-27 ENCOUNTER — APPOINTMENT (OUTPATIENT)
Dept: SURGERY | Facility: CLINIC | Age: 34
End: 2024-02-27
Payer: COMMERCIAL

## 2024-02-27 ENCOUNTER — APPOINTMENT (OUTPATIENT)
Dept: DERMATOLOGY | Facility: CLINIC | Age: 34
End: 2024-02-27

## 2024-02-27 VITALS
HEIGHT: 63 IN | HEART RATE: 80 BPM | OXYGEN SATURATION: 98 % | BODY MASS INDEX: 23.04 KG/M2 | RESPIRATION RATE: 17 BRPM | WEIGHT: 130 LBS | DIASTOLIC BLOOD PRESSURE: 77 MMHG | SYSTOLIC BLOOD PRESSURE: 116 MMHG | TEMPERATURE: 97.3 F

## 2024-02-27 DIAGNOSIS — K42.9 UMBILICAL HERNIA W/OUT OBSTRUCTION OR GANGRENE: ICD-10-CM

## 2024-02-27 DIAGNOSIS — M62.08 SEPARATION OF MUSCLE (NONTRAUMATIC), OTHER SITE: ICD-10-CM

## 2024-02-27 PROCEDURE — 99203 OFFICE O/P NEW LOW 30 MIN: CPT

## 2024-02-27 RX ORDER — TRETINOIN 0.25 MG/G
0.03 CREAM TOPICAL
Qty: 1 | Refills: 6 | Status: DISCONTINUED | COMMUNITY
Start: 2020-04-14 | End: 2024-02-27

## 2024-02-27 RX ORDER — TACROLIMUS 1 MG/G
0.1 OINTMENT TOPICAL
Qty: 2 | Refills: 2 | Status: DISCONTINUED | COMMUNITY
Start: 2019-11-18 | End: 2024-02-27

## 2024-02-27 RX ORDER — AZELAIC ACID 0.15 G/G
15 GEL TOPICAL
Qty: 1 | Refills: 5 | Status: DISCONTINUED | COMMUNITY
Start: 2020-04-14 | End: 2024-02-27

## 2024-02-27 RX ORDER — KETOCONAZOLE 20 MG/G
2 CREAM TOPICAL
Qty: 1 | Refills: 2 | Status: DISCONTINUED | COMMUNITY
Start: 2020-05-05 | End: 2024-02-27

## 2024-02-27 RX ORDER — TRIAMCINOLONE ACETONIDE 1 MG/G
0.1 OINTMENT TOPICAL
Qty: 1 | Refills: 0 | Status: DISCONTINUED | COMMUNITY
Start: 2019-11-18 | End: 2024-02-27

## 2024-02-27 RX ORDER — MULTIVITAMIN
TABLET ORAL
Refills: 0 | Status: ACTIVE | COMMUNITY

## 2024-02-27 NOTE — PHYSICAL EXAM
[Normal Breath Sounds] : Normal breath sounds [Normal Heart Sounds] : normal heart sounds [Alert] : alert [Oriented to Person] : oriented to person [Oriented to Place] : oriented to place [Oriented to Time] : oriented to time [Calm] : calm [de-identified] : WNL [de-identified] : WNL [de-identified] : VINL [de-identified] : Soft, nontender, moderate diastases recti and small nontender fat-containing reducible umbilical hernia. [de-identified] : WNL [de-identified] : WNL ROM

## 2024-02-27 NOTE — ASSESSMENT
[FreeTextEntry1] : In summary the patient is 16 weeks postpartum following vaginal delivery of her second child.  She has an asymptomatic moderate diastases recti and an asymptomatic fat-containing umbilical hernia.  I reassured her that neither of these requires surgery unless they are causing discomfort or be or if her umbilical hernia is getting larger.  She is considering having additional children.  I therefore explained that I would not recommend surgery for either her diastases or her umbilical hernia until she has completed childbearing and only if her umbilical hernia is symptomatic.  I explained that diastases repair typically involves plastic surgery for abdominoplasty.  Either way I reassured her that there is no indication for surgery at the present time.  She will follow-up with me as needed.

## 2024-02-27 NOTE — HISTORY OF PRESENT ILLNESS
[de-identified] : Anuja is a 34 y/o female here for consultation, ventral hernia/bulge.  Today pt reports no pain. Normal BMs, denies constipation or diarrhea. Denies nausea and vomiting. Good appetite. Not taking any anticoagulants. Does see a bulge of upper abdomen that pt noticed a month, denies increase in size of bulge, denies hearing gurgling noises, and denies discomfort from area. 16 weeks postpartum.

## 2024-03-04 ENCOUNTER — APPOINTMENT (OUTPATIENT)
Dept: DERMATOLOGY | Facility: CLINIC | Age: 34
End: 2024-03-04

## 2024-03-13 NOTE — OB RN DELIVERY SUMMARY - NS_DELIVERYRN_OBGYN_ALL_OB_FT
Addended by: SRINIVASAN BOWMAN on: 3/13/2024 10:48 AM     Modules accepted: Orders     Noemi Santana RN

## 2024-04-01 ENCOUNTER — NON-APPOINTMENT (OUTPATIENT)
Age: 34
End: 2024-04-01

## 2024-04-01 ENCOUNTER — APPOINTMENT (OUTPATIENT)
Dept: INTERNAL MEDICINE | Facility: CLINIC | Age: 34
End: 2024-04-01
Payer: COMMERCIAL

## 2024-04-01 VITALS
BODY MASS INDEX: 29.64 KG/M2 | HEIGHT: 61.02 IN | WEIGHT: 157 LBS | DIASTOLIC BLOOD PRESSURE: 80 MMHG | SYSTOLIC BLOOD PRESSURE: 115 MMHG | HEART RATE: 88 BPM | OXYGEN SATURATION: 99 %

## 2024-04-01 DIAGNOSIS — R06.83 SNORING: ICD-10-CM

## 2024-04-01 DIAGNOSIS — M54.50 LOW BACK PAIN, UNSPECIFIED: ICD-10-CM

## 2024-04-01 DIAGNOSIS — R63.5 ABNORMAL WEIGHT GAIN: ICD-10-CM

## 2024-04-01 DIAGNOSIS — B35.3 TINEA PEDIS: ICD-10-CM

## 2024-04-01 DIAGNOSIS — Z00.00 ENCOUNTER FOR GENERAL ADULT MEDICAL EXAMINATION W/OUT ABNORMAL FINDINGS: ICD-10-CM

## 2024-04-01 PROCEDURE — 93000 ELECTROCARDIOGRAM COMPLETE: CPT

## 2024-04-01 PROCEDURE — 36415 COLL VENOUS BLD VENIPUNCTURE: CPT

## 2024-04-01 PROCEDURE — 99385 PREV VISIT NEW AGE 18-39: CPT

## 2024-04-01 RX ORDER — NYSTATIN 100000 1/G
100000 POWDER TOPICAL 3 TIMES DAILY
Qty: 1 | Refills: 0 | Status: ACTIVE | COMMUNITY
Start: 2024-04-01 | End: 1900-01-01

## 2024-04-02 NOTE — ASSESSMENT
[FreeTextEntry1] : //  Lumbar pain, muscle strain -NSAIDs, such as ibuprofen, Tylenol help decrease swelling, pain, -Acupressure may be recommended to decrease pain and improve movement. Acupressure is pressure or localized massage to the area of your back pain. -Apply ice to affected area for 15 to 20 minutes every hour or as directed. Use an ice pack, or put crushed ice in a plastic bag. Cover it with a towel before you apply it to your skin. Ice helps prevent tissue damage and decreases pain. -Apply heat to affected area for 20 to 30 minutes every 2 hours for as many days as directed. Heat helps decrease pain and muscle spasms. -Stay active as much as you can without causing more pain. Bed rest could make your back pain worse. Avoid heavy lifting until your pain is gone. -Go to physical therapy as directed. A physical therapist can teach you exercises to help improve movement and strength, and to decrease pain. -Follow-up in 4-6 weeks if symptoms persist, sooner if symptoms worsen.  Snoring, likely related to weight gain from pregnancy, low likelihood of NEEL. -Advised to focus on weight loss and if symptoms continue will get sleep study  Overweight -Being overweight increases your risk of health conditions such as heart disease, high blood pressure, type 2 diabetes, and certain types of cancer. It can also increase your risk for osteoarthritis, sleep apnea, and other respiratory problems. Aim for a slow, steady weight loss. Even a small amount of weight loss can lower your risk of health problems. -A safe and healthy way to lose weight is to eat fewer calories and get regular exercise. You can lose up about 1 pound a week by decreasing the number of calories you eat by 500 calories each day. You can decrease calories by eating smaller portion sizes or by cutting out high-calorie foods. Read labels to find out how many calories are in the foods you eat. You can also burn calories with exercise such as walking, swimming, or biking. You will be more likely to keep weight off if you make these changes part of your lifestyle. -Exercise at least 30 minutes per day on most days of the week. Some examples of exercise include walking, biking, dancing, and swimming. You can also fit in more physical activity by taking the stairs instead of the elevator or parking farther away from stores.  Healthcare Maintenance -Advise Yearly Skin cancer screening with Dermatologist  -Advise Yearly Eye exam with Ophthalmologist -Advise Yearly Dental exam -Educated of the importance of Healthy diet, such as Mediterranean Diet and Exercise, such as walking >20 minutes a day and increasing gradually as tolerated  Immunizations -Flu vaccine  -Covid vaccine   Preventative screening  -advised to get PAP for cervical cancer screening -up to date

## 2024-04-02 NOTE — HISTORY OF PRESENT ILLNESS
[de-identified] : 33 year old female presents for initial annual exam.  c/o back pain for the last several months.  no radiation or associated symptoms.  better after taking nsaids.   more snoring with increased weight States this is the heaviest she has has been.  Motivated to get back to her baseline weight of around 130-140 Slight pedal edema noted Currently breast-feeding.  Not on oral contraceptive but plan on IUD  diet- more home cooking recently.  +water.  no soda, juicies or energy drinks.   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

## 2024-04-02 NOTE — HEALTH RISK ASSESSMENT
[No falls in past year] : Patient reported no falls in the past year [Good] : ~his/her~  mood as  good [0] : 1) Little interest or pleasure doing things: Not at all (0) [PHQ-2 Negative - No further assessment needed] : PHQ-2 Negative - No further assessment needed [Patient reported PAP Smear was normal] : Patient reported PAP Smear was normal [Never] : Never [Change in mental status noted] : No change in mental status noted [[id number]] : 0 [PapSmearDate] : 2023

## 2024-04-03 ENCOUNTER — CLINICAL ADVICE (OUTPATIENT)
Age: 34
End: 2024-04-03

## 2024-04-03 LAB
25(OH)D3 SERPL-MCNC: 25.9 NG/ML
ALBUMIN SERPL ELPH-MCNC: 4.3 G/DL
ALP BLD-CCNC: 79 U/L
ALT SERPL-CCNC: 21 U/L
ANION GAP SERPL CALC-SCNC: 11 MMOL/L
APPEARANCE: CLEAR
AST SERPL-CCNC: 18 U/L
BACTERIA: ABNORMAL /HPF
BASOPHILS # BLD AUTO: 0.06 K/UL
BASOPHILS NFR BLD AUTO: 0.7 %
BILIRUB DIRECT SERPL-MCNC: 0 MG/DL
BILIRUB INDIRECT SERPL-MCNC: NORMAL MG/DL
BILIRUB SERPL-MCNC: 0.2 MG/DL
BILIRUBIN URINE: NEGATIVE
BLOOD URINE: NEGATIVE
BUN SERPL-MCNC: 13 MG/DL
CALCIUM SERPL-MCNC: 9.3 MG/DL
CAST: 0 /LPF
CHLORIDE SERPL-SCNC: 102 MMOL/L
CHOLEST SERPL-MCNC: 211 MG/DL
CO2 SERPL-SCNC: 26 MMOL/L
COLOR: YELLOW
CREAT SERPL-MCNC: 0.86 MG/DL
EGFR: 90 ML/MIN/1.73M2
EOSINOPHIL # BLD AUTO: 0.48 K/UL
EOSINOPHIL NFR BLD AUTO: 5.9 %
EPITHELIAL CELLS: 4 /HPF
ESTIMATED AVERAGE GLUCOSE: 123 MG/DL
FERRITIN SERPL-MCNC: 137 NG/ML
FOLATE SERPL-MCNC: >20 NG/ML
GLUCOSE QUALITATIVE U: NEGATIVE MG/DL
GLUCOSE SERPL-MCNC: 101 MG/DL
HBA1C MFR BLD HPLC: 5.9 %
HCT VFR BLD CALC: 40 %
HDLC SERPL-MCNC: 42 MG/DL
HGB BLD-MCNC: 12.9 G/DL
IMM GRANULOCYTES NFR BLD AUTO: 0.1 %
IRON SATN MFR SERPL: 18 %
IRON SERPL-MCNC: 53 UG/DL
KETONES URINE: NEGATIVE MG/DL
LDLC SERPL CALC-MCNC: 110 MG/DL
LEUKOCYTE ESTERASE URINE: ABNORMAL
LYMPHOCYTES # BLD AUTO: 2.47 K/UL
LYMPHOCYTES NFR BLD AUTO: 30.3 %
MAN DIFF?: NORMAL
MCHC RBC-ENTMCNC: 28.4 PG
MCHC RBC-ENTMCNC: 32.3 GM/DL
MCV RBC AUTO: 87.9 FL
MICROSCOPIC-UA: NORMAL
MONOCYTES # BLD AUTO: 0.58 K/UL
MONOCYTES NFR BLD AUTO: 7.1 %
NEUTROPHILS # BLD AUTO: 4.56 K/UL
NEUTROPHILS NFR BLD AUTO: 55.9 %
NITRITE URINE: NEGATIVE
NONHDLC SERPL-MCNC: 168 MG/DL
PH URINE: 7
PLATELET # BLD AUTO: 336 K/UL
POTASSIUM SERPL-SCNC: 4.3 MMOL/L
PROT SERPL-MCNC: 7.2 G/DL
PROTEIN URINE: NEGATIVE MG/DL
RBC # BLD: 4.55 M/UL
RBC # FLD: 12.7 %
RED BLOOD CELLS URINE: 0 /HPF
SODIUM SERPL-SCNC: 139 MMOL/L
SPECIFIC GRAVITY URINE: 1.02
TIBC SERPL-MCNC: 299 UG/DL
TRIGL SERPL-MCNC: 337 MG/DL
TSH SERPL-ACNC: 1.25 UIU/ML
UIBC SERPL-MCNC: 246 UG/DL
UROBILINOGEN URINE: 0.2 MG/DL
VIT B12 SERPL-MCNC: 908 PG/ML
WBC # FLD AUTO: 8.16 K/UL
WHITE BLOOD CELLS URINE: 6 /HPF

## 2024-06-05 ENCOUNTER — APPOINTMENT (OUTPATIENT)
Dept: ORTHOPEDIC SURGERY | Facility: CLINIC | Age: 34
End: 2024-06-05
Payer: COMMERCIAL

## 2024-06-05 VITALS — HEIGHT: 61 IN | WEIGHT: 157 LBS | BODY MASS INDEX: 29.64 KG/M2

## 2024-06-05 DIAGNOSIS — M53.3 SACROCOCCYGEAL DISORDERS, NOT ELSEWHERE CLASSIFIED: ICD-10-CM

## 2024-06-05 DIAGNOSIS — M62.830 MUSCLE SPASM OF BACK: ICD-10-CM

## 2024-06-05 PROCEDURE — 72110 X-RAY EXAM L-2 SPINE 4/>VWS: CPT

## 2024-06-05 PROCEDURE — 99203 OFFICE O/P NEW LOW 30 MIN: CPT

## 2024-06-05 PROCEDURE — 72170 X-RAY EXAM OF PELVIS: CPT

## 2024-06-05 PROCEDURE — 72200 X-RAY EXAM SI JOINTS: CPT

## 2024-06-05 NOTE — IMAGING
[AP] : anteroposterior [There are no fractures, subluxations or dislocations. No significant abnormalities are seen] : There are no fractures, subluxations or dislocations. No significant abnormalities are seen [No bony abnormalities] : No bony abnormalities [de-identified] : LSPINE Inspection: No rash or ecchymosis. No lesions in skin surrounding coccyx Palpation: TTP in coccyx. No tenderness to palpation or spasm in bilateral thoracic and lumbar paraspinal musculature, no SI joint tenderness to palpation ROM: Full with no pain Strength: nonfocal BLEs Sensation: nonfocal BLEs    [FreeTextEntry1] : minimal DDD

## 2024-06-05 NOTE — HISTORY OF PRESENT ILLNESS
[Lower back] : lower back [de-identified] : [birth year]34 y/o F presents for intermittent tailbone pain since 10/2023. Denies pain/N/T in BLEs. Denies injury, although states pain began late in her pregnancy. Pain with sitting, as well as standing from seated. Has tried heating pads, with partial relief. Denies recent physical therapy. Denies b/b dysfunction. Denies prior lower back surgeries. Patient is currently breastfeeding.   PMH: Denies [] : no [FreeTextEntry5] : No reported injury, pain has been on and off since October 2023. Patient denies numbness/tingling; pain worsened post-partum.

## 2024-06-05 NOTE — ASSESSMENT
[FreeTextEntry1] : Meds as per OB/GYN given patient is currently breastfeeding.  Advised pelvic floor PT given coccydynia following childbirth  Patient seen by Radha White PA-C, with and under the supervision of  Dr. Troy Campos M.D.

## 2024-06-13 ENCOUNTER — APPOINTMENT (OUTPATIENT)
Dept: ORTHOPEDIC SURGERY | Facility: CLINIC | Age: 34
End: 2024-06-13

## 2024-07-10 ENCOUNTER — APPOINTMENT (OUTPATIENT)
Dept: ORTHOPEDIC SURGERY | Facility: CLINIC | Age: 34
End: 2024-07-10

## 2024-10-17 NOTE — OB RN PATIENT PROFILE - ANESTHESIA, PREVIOUS REACTION, PROFILE
Detail Level: Detailed Quality 47: Advance Care Plan: Advance Care Planning discussed and documented; advance care plan or surrogate decision maker documented in the medical record. Quality 226: Preventive Care And Screening: Tobacco Use: Screening And Cessation Intervention: Patient screened for tobacco use and is an ex/non-smoker unknown

## 2024-11-20 ENCOUNTER — NON-APPOINTMENT (OUTPATIENT)
Age: 34
End: 2024-11-20

## 2025-03-04 ENCOUNTER — ASOB RESULT (OUTPATIENT)
Age: 35
End: 2025-03-04

## 2025-03-04 ENCOUNTER — APPOINTMENT (OUTPATIENT)
Dept: ANTEPARTUM | Facility: CLINIC | Age: 35
End: 2025-03-04
Payer: COMMERCIAL

## 2025-03-04 PROCEDURE — 99202 OFFICE O/P NEW SF 15 MIN: CPT | Mod: 25

## 2025-03-04 PROCEDURE — 76811 OB US DETAILED SNGL FETUS: CPT

## 2025-04-01 ENCOUNTER — APPOINTMENT (OUTPATIENT)
Dept: ANTEPARTUM | Facility: CLINIC | Age: 35
End: 2025-04-01
Payer: COMMERCIAL

## 2025-04-01 ENCOUNTER — ASOB RESULT (OUTPATIENT)
Age: 35
End: 2025-04-01

## 2025-04-01 PROCEDURE — 76816 OB US FOLLOW-UP PER FETUS: CPT

## 2025-04-27 NOTE — OB PROVIDER LABOR PROGRESS NOTE - NS_SUBJECTIVE/OBJECTIVE_OBGYN_ALL_OB_FT
Patient arrives to ED via [facility] from home complaining of abdominal and back pain x1 week rated 8/10. Says that it feels similar to previous bowel obstructions. Endorses nausea and one occurrence of vomiting yesterday.   Past Medical History:   Diagnosis Date    Chondromalacia of patella     COPD (chronic obstructive pulmonary disease)  (CMD)     Diabetes mellitus  (CMD)     borderline, no meds    Fracture     bone in back of knee, arm as child    GERD (gastroesophageal reflux disease)     H. pylori infection 07/28/2021    Dr. Bourgeois, 10 daysH Pylori treatment.    History of tobacco use     Hypertension     Hypokalemia 03/11/2021    Incidental finding on 03/10 in ED. Might be related to chronic diarrhea. Currently taking potassium supplements.      Low HDL (under 40) 03/26/2021    Prediabetes     RUQ discomfort 01/15/2022    Unspecified asthma(493.90)     Uterine fibroid        
NP Chart Note:    Prolonged deceleration for 6 minutes with multiple late decelerations noted.  The patient was noted to have a tachysystole contraction pattern Q1-2mins with rapid dilatation.  Pitocin discontinued, IV hydration, and terbutaline .25 mg administered with good recovery in FHR.  The patient was noted to be 10/100/-2
Pt examined at bedside, comfortable
Called to evaluate patient secondary to prolonged decel  Pt comfortable after epidural    T(C): 36.8 (10-22-23 @ 22:47), Max: 37.0 (10-22-23 @ 20:01)  HR: 88 (10-23-23 @ 00:33) (68 - 100)  BP: 113/59 (10-23-23 @ 00:32) (85/57 - 119/58)  RR: 18 (10-22-23 @ 22:47) (18 - 18)  SpO2: 98% (10-23-23 @ 00:33) (92% - 99%)
Pt seen at bedside feeling more pressure. Fully/-2 but -1 with ctx. No molding. Pitocin at 1mU

## 2025-06-03 ENCOUNTER — APPOINTMENT (OUTPATIENT)
Dept: INTERNAL MEDICINE | Facility: CLINIC | Age: 35
End: 2025-06-03

## 2025-07-09 ENCOUNTER — OUTPATIENT (OUTPATIENT)
Dept: OUTPATIENT SERVICES | Facility: HOSPITAL | Age: 35
LOS: 1 days | End: 2025-07-09
Payer: COMMERCIAL

## 2025-07-09 VITALS
WEIGHT: 158.95 LBS | SYSTOLIC BLOOD PRESSURE: 101 MMHG | OXYGEN SATURATION: 98 % | HEIGHT: 63 IN | DIASTOLIC BLOOD PRESSURE: 69 MMHG | HEART RATE: 104 BPM | RESPIRATION RATE: 20 BRPM | TEMPERATURE: 99 F

## 2025-07-09 DIAGNOSIS — Z34.90 ENCOUNTER FOR SUPERVISION OF NORMAL PREGNANCY, UNSPECIFIED, UNSPECIFIED TRIMESTER: ICD-10-CM

## 2025-07-09 DIAGNOSIS — Z01.818 ENCOUNTER FOR OTHER PREPROCEDURAL EXAMINATION: ICD-10-CM

## 2025-07-09 DIAGNOSIS — K08.409 PARTIAL LOSS OF TEETH, UNSPECIFIED CAUSE, UNSPECIFIED CLASS: Chronic | ICD-10-CM

## 2025-07-09 LAB
ANION GAP SERPL CALC-SCNC: 15 MMOL/L — SIGNIFICANT CHANGE UP (ref 5–17)
BLD GP AB SCN SERPL QL: NEGATIVE — SIGNIFICANT CHANGE UP
BUN SERPL-MCNC: 8 MG/DL — SIGNIFICANT CHANGE UP (ref 7–23)
CALCIUM SERPL-MCNC: 9.1 MG/DL — SIGNIFICANT CHANGE UP (ref 8.4–10.5)
CHLORIDE SERPL-SCNC: 103 MMOL/L — SIGNIFICANT CHANGE UP (ref 96–108)
CO2 SERPL-SCNC: 18 MMOL/L — LOW (ref 22–31)
CREAT SERPL-MCNC: 0.4 MG/DL — LOW (ref 0.5–1.3)
EGFR: 131 ML/MIN/1.73M2 — SIGNIFICANT CHANGE UP
EGFR: 131 ML/MIN/1.73M2 — SIGNIFICANT CHANGE UP
GLUCOSE SERPL-MCNC: 110 MG/DL — HIGH (ref 70–99)
HCT VFR BLD CALC: 37.8 % — SIGNIFICANT CHANGE UP (ref 34.5–45)
HGB BLD-MCNC: 12.4 G/DL — SIGNIFICANT CHANGE UP (ref 11.5–15.5)
MCHC RBC-ENTMCNC: 28.6 PG — SIGNIFICANT CHANGE UP (ref 27–34)
MCHC RBC-ENTMCNC: 32.8 G/DL — SIGNIFICANT CHANGE UP (ref 32–36)
MCV RBC AUTO: 87.1 FL — SIGNIFICANT CHANGE UP (ref 80–100)
NRBC # BLD AUTO: 0 K/UL — SIGNIFICANT CHANGE UP (ref 0–0)
NRBC # FLD: 0 K/UL — SIGNIFICANT CHANGE UP (ref 0–0)
NRBC BLD AUTO-RTO: 0 /100 WBCS — SIGNIFICANT CHANGE UP (ref 0–0)
PLATELET # BLD AUTO: 219 K/UL — SIGNIFICANT CHANGE UP (ref 150–400)
PMV BLD: 9.9 FL — SIGNIFICANT CHANGE UP (ref 7–13)
POTASSIUM SERPL-MCNC: 4.1 MMOL/L — SIGNIFICANT CHANGE UP (ref 3.5–5.3)
POTASSIUM SERPL-SCNC: 4.1 MMOL/L — SIGNIFICANT CHANGE UP (ref 3.5–5.3)
RBC # BLD: 4.34 M/UL — SIGNIFICANT CHANGE UP (ref 3.8–5.2)
RBC # FLD: 14.7 % — HIGH (ref 10.3–14.5)
RH IG SCN BLD-IMP: POSITIVE — SIGNIFICANT CHANGE UP
SODIUM SERPL-SCNC: 136 MMOL/L — SIGNIFICANT CHANGE UP (ref 135–145)
WBC # BLD: 8.8 K/UL — SIGNIFICANT CHANGE UP (ref 3.8–10.5)
WBC # FLD AUTO: 8.8 K/UL — SIGNIFICANT CHANGE UP (ref 3.8–10.5)

## 2025-07-09 PROCEDURE — 80048 BASIC METABOLIC PNL TOTAL CA: CPT

## 2025-07-09 PROCEDURE — 85027 COMPLETE CBC AUTOMATED: CPT

## 2025-07-09 PROCEDURE — G0463: CPT

## 2025-07-09 RX ORDER — SODIUM CHLORIDE 9 G/1000ML
1000 INJECTION, SOLUTION INTRAVENOUS
Refills: 0 | Status: DISCONTINUED | OUTPATIENT
Start: 2025-07-15 | End: 2025-07-18

## 2025-07-09 NOTE — OB PST NOTE - NSICDXPASTMEDICALHX_GEN_ALL_CORE_FT
PAST MEDICAL HISTORY:   (normal spontaneous vaginal delivery)      PAST MEDICAL HISTORY:  Currently pregnant     History of shoulder dystocia in prior pregnancy     Myalgia of pelvic floor

## 2025-07-09 NOTE — OB PST NOTE - PROBLEM SELECTOR PLAN 1
Scheduled for primary    preop instruction discussed and writing instruction given, patient verbalized understanding and teach back   ERP  ABO on admit  labs - cbc, bmp and t&s result pending

## 2025-07-09 NOTE — OB PST NOTE - NSLASTDATEVISIT_OBGYN_ALL_OB
Known Doxepin Counseling:  Patient advised that the medication is sedating and not to drive a car after taking this medication. Patient informed of potential adverse effects including but not limited to dry mouth, urinary retention, and blurry vision.  The patient verbalized understanding of the proper use and possible adverse effects of doxepin.  All of the patient's questions and concerns were addressed.

## 2025-07-09 NOTE — OB PST NOTE - ASSESSMENT
CAPRINI SCORE    AGE RELATED RISK FACTORS                                                             [ ] Age 41-60 years                                            (1 Point)  [ ] Age: 61-74 years                                           (2 Points)                 [ ] Age= 75 years                                                (3 Points)             DISEASE RELATED RISK FACTORS                                                       [ ] Edema in the lower extremities                 (1 Point)                     [ ] Varicose veins                                               (1 Point)                                 [1 ] BMI > 25 Kg/m2                                            (1 Point)                                  [ ] Serious infection (ie PNA)                            (1 Point)                     [ ] Lung disease ( COPD, Emphysema)            (1 Point)                                                                          [ ] Acute myocardial infarction                         (1 Point)                  [ ] Congestive heart failure (in the previous month)  (1 Point)         [ ] Inflammatory bowel disease                            (1 Point)                  [ ] Central venous access, PICC or Port               (2 points)       (within the last month)                                                                [ ] Stroke (in the previous month)                        (5 Points)    [ ] Previous or present malignancy                       (2 points)                                                                                                                                                         HEMATOLOGY RELATED FACTORS                                                         [ ] Prior episodes of VTE                                     (3 Points)                     [ ] Positive family history for VTE                      (3 Points)                  [ ] Prothrombin 51605 A                                     (3 Points)                     [ ] Factor V Leiden                                                (3 Points)                        [ ] Lupus anticoagulants                                      (3 Points)                                                           [ ] Anticardiolipin antibodies                              (3 Points)                                                       [ ] High homocysteine in the blood                   (3 Points)                                             [ ] Other congenital or acquired thrombophilia      (3 Points)                                                [ ] Heparin induced thrombocytopenia                  (3 Points)                                        MOBILITY RELATED FACTORS  [ ] Bed rest                                                         (1 Point)  [ ] Plaster cast                                                    (2 points)  [ ] Bed bound for more than 72 hours           (2 Points)    GENDER SPECIFIC FACTORS  [ 1] Pregnancy or had a baby within the last month   (1 Point)  [ ] Post-partum < 6 weeks                                   (1 Point)  [ ] Hormonal therapy  or oral contraception   (1 Point)  [ ] History of pregnancy complications              (1 point)  [ ] Unexplained or recurrent              (1 Point)    OTHER RISK FACTORS                                           (1 Point)  [ ] BMI >40, smoking, diabetes requiring insulin, chemotherapy  blood transfusions and length of surgery over 2 hours    SURGERY RELATED RISK FACTORS  [ ]  Section within the last month     (1 Point)  [ ] Minor surgery                                                  (1 Point)  [ ] Arthroscopic surgery                                       (2 Points)  [2 ] Planned major surgery lasting more            (2 Points)      than 45 minutes     [ ] Elective hip or knee joint replacement       (5 points)       surgery                                                TRAUMA RELATED RISK FACTORS  [ ] Fracture of the hip, pelvis, or leg                       (5 Points)  [ ] Spinal cord injury resulting in paralysis             (5 points)       (in the previous month)    [ ] Paralysis  (less than 1 month)                             (5 Points)  [ ] Multiple Trauma within 1 month                        (5 Points)    Total Score [4]    Caprini Score 0-2: Low Risk, NO VTE prophylaxis required for most patients, encourage ambulation  Caprini Score 3-6: Moderate Risk , pharmacologic VTE prophylaxis is indicated for most patients (in the absence of contraindications)  Caprini Score Greater than or =7: High risk, pharmocologic VTE prophylaxis indicated for most patients (in the absence of contraindications)

## 2025-07-09 NOTE — OB PST NOTE - HISTORY OF PRESENT ILLNESS
last sono today   pelvic floor thera;y since 2023 34 year old  presents to [facility] for scheduled primary  on 7/15/2025. last abdominal sonogram on 2025, currently 38w4d gestation, reports baby movement, no contractions. h/o pelvic floor myalgia post delivery in  (weekly therapy), shoulder dystocia in prior pregnancy.    ***on Aspirin daily

## 2025-07-15 ENCOUNTER — INPATIENT (INPATIENT)
Facility: HOSPITAL | Age: 35
LOS: 2 days | Discharge: HOME | DRG: 998 | End: 2025-07-18
Attending: OBSTETRICS & GYNECOLOGY | Admitting: OBSTETRICS & GYNECOLOGY
Payer: COMMERCIAL

## 2025-07-15 VITALS — OXYGEN SATURATION: 96 % | DIASTOLIC BLOOD PRESSURE: 58 MMHG | HEART RATE: 91 BPM | SYSTOLIC BLOOD PRESSURE: 100 MMHG

## 2025-07-15 DIAGNOSIS — K08.409 PARTIAL LOSS OF TEETH, UNSPECIFIED CAUSE, UNSPECIFIED CLASS: Chronic | ICD-10-CM

## 2025-07-15 LAB
HCT VFR BLD CALC: 34.4 % — LOW (ref 34.5–45)
HGB BLD-MCNC: 11.4 G/DL — LOW (ref 11.5–15.5)
MCHC RBC-ENTMCNC: 28.5 PG — SIGNIFICANT CHANGE UP (ref 27–34)
MCHC RBC-ENTMCNC: 33.1 G/DL — SIGNIFICANT CHANGE UP (ref 32–36)
MCV RBC AUTO: 86 FL — SIGNIFICANT CHANGE UP (ref 80–100)
NRBC # BLD AUTO: 0 K/UL — SIGNIFICANT CHANGE UP (ref 0–0)
NRBC # FLD: 0 K/UL — SIGNIFICANT CHANGE UP (ref 0–0)
NRBC BLD AUTO-RTO: 0 /100 WBCS — SIGNIFICANT CHANGE UP (ref 0–0)
PLATELET # BLD AUTO: 198 K/UL — SIGNIFICANT CHANGE UP (ref 150–400)
PMV BLD: 9.7 FL — SIGNIFICANT CHANGE UP (ref 7–13)
RBC # BLD: 4 M/UL — SIGNIFICANT CHANGE UP (ref 3.8–5.2)
RBC # FLD: 14.5 % — SIGNIFICANT CHANGE UP (ref 10.3–14.5)
WBC # BLD: 15.25 K/UL — HIGH (ref 3.8–10.5)
WBC # FLD AUTO: 15.25 K/UL — HIGH (ref 3.8–10.5)

## 2025-07-15 PROCEDURE — 85027 COMPLETE CBC AUTOMATED: CPT

## 2025-07-15 PROCEDURE — 86901 BLOOD TYPING SEROLOGIC RH(D): CPT

## 2025-07-15 PROCEDURE — 86780 TREPONEMA PALLIDUM: CPT

## 2025-07-15 PROCEDURE — 88302 TISSUE EXAM BY PATHOLOGIST: CPT | Mod: 26

## 2025-07-15 PROCEDURE — 86850 RBC ANTIBODY SCREEN: CPT

## 2025-07-15 PROCEDURE — 86900 BLOOD TYPING SEROLOGIC ABO: CPT

## 2025-07-15 DEVICE — SURGICEL POWDER 3 GRAMS: Type: IMPLANTABLE DEVICE | Status: FUNCTIONAL

## 2025-07-15 DEVICE — SURGICEL 2 X 14": Type: IMPLANTABLE DEVICE | Status: FUNCTIONAL

## 2025-07-15 RX ORDER — IBUPROFEN 200 MG
600 TABLET ORAL EVERY 6 HOURS
Refills: 0 | Status: COMPLETED | OUTPATIENT
Start: 2025-07-15 | End: 2026-06-13

## 2025-07-15 RX ORDER — DEXAMETHASONE 0.5 MG/1
4 TABLET ORAL EVERY 6 HOURS
Refills: 0 | Status: DISCONTINUED | OUTPATIENT
Start: 2025-07-16 | End: 2025-07-18

## 2025-07-15 RX ORDER — DEXAMETHASONE 0.5 MG/1
4 TABLET ORAL EVERY 6 HOURS
Refills: 0 | Status: DISCONTINUED | OUTPATIENT
Start: 2025-07-15 | End: 2025-07-16

## 2025-07-15 RX ORDER — SODIUM CHLORIDE 9 G/1000ML
1000 INJECTION, SOLUTION INTRAVENOUS
Refills: 0 | Status: DISCONTINUED | OUTPATIENT
Start: 2025-07-15 | End: 2025-07-16

## 2025-07-15 RX ORDER — NALBUPHINE HYDROCHLORIDE 10 MG/ML
2.5 INJECTION INTRAMUSCULAR; INTRAVENOUS; SUBCUTANEOUS EVERY 6 HOURS
Refills: 0 | Status: DISCONTINUED | OUTPATIENT
Start: 2025-07-16 | End: 2025-07-18

## 2025-07-15 RX ORDER — NALBUPHINE HYDROCHLORIDE 10 MG/ML
2.5 INJECTION INTRAMUSCULAR; INTRAVENOUS; SUBCUTANEOUS EVERY 6 HOURS
Refills: 0 | Status: DISCONTINUED | OUTPATIENT
Start: 2025-07-15 | End: 2025-07-16

## 2025-07-15 RX ORDER — ONDANSETRON HCL/PF 4 MG/2 ML
4 VIAL (ML) INJECTION EVERY 6 HOURS
Refills: 0 | Status: DISCONTINUED | OUTPATIENT
Start: 2025-07-15 | End: 2025-07-16

## 2025-07-15 RX ORDER — KETOROLAC TROMETHAMINE 30 MG/ML
30 INJECTION, SOLUTION INTRAMUSCULAR; INTRAVENOUS EVERY 6 HOURS
Refills: 0 | Status: DISCONTINUED | OUTPATIENT
Start: 2025-07-15 | End: 2025-07-16

## 2025-07-15 RX ORDER — OXYTOCIN-SODIUM CHLORIDE 0.9% IV SOLN 30 UNIT/500ML 30-0.9/5 UT/ML-%
42 SOLUTION INTRAVENOUS
Qty: 30 | Refills: 0 | Status: DISCONTINUED | OUTPATIENT
Start: 2025-07-15 | End: 2025-07-18

## 2025-07-15 RX ORDER — ACETAMINOPHEN 500 MG/5ML
975 LIQUID (ML) ORAL
Refills: 0 | Status: DISCONTINUED | OUTPATIENT
Start: 2025-07-15 | End: 2025-07-18

## 2025-07-15 RX ORDER — ONDANSETRON HCL/PF 4 MG/2 ML
4 VIAL (ML) INJECTION EVERY 6 HOURS
Refills: 0 | Status: DISCONTINUED | OUTPATIENT
Start: 2025-07-16 | End: 2025-07-18

## 2025-07-15 RX ORDER — SIMETHICONE 80 MG
80 TABLET,CHEWABLE ORAL EVERY 4 HOURS
Refills: 0 | Status: DISCONTINUED | OUTPATIENT
Start: 2025-07-15 | End: 2025-07-18

## 2025-07-15 RX ORDER — OXYCODONE HYDROCHLORIDE 30 MG/1
10 TABLET ORAL
Refills: 0 | Status: DISCONTINUED | OUTPATIENT
Start: 2025-07-16 | End: 2025-07-16

## 2025-07-15 RX ORDER — OXYCODONE HYDROCHLORIDE 30 MG/1
5 TABLET ORAL
Refills: 0 | Status: DISCONTINUED | OUTPATIENT
Start: 2025-07-16 | End: 2025-07-17

## 2025-07-15 RX ORDER — OXYCODONE HYDROCHLORIDE 30 MG/1
5 TABLET ORAL
Refills: 0 | Status: COMPLETED | OUTPATIENT
Start: 2025-07-15 | End: 2025-07-22

## 2025-07-15 RX ORDER — OXYCODONE HYDROCHLORIDE 30 MG/1
5 TABLET ORAL
Refills: 0 | Status: DISCONTINUED | OUTPATIENT
Start: 2025-07-15 | End: 2025-07-16

## 2025-07-15 RX ORDER — NALOXONE HYDROCHLORIDE 0.4 MG/ML
0.1 INJECTION, SOLUTION INTRAMUSCULAR; INTRAVENOUS; SUBCUTANEOUS
Refills: 0 | Status: DISCONTINUED | OUTPATIENT
Start: 2025-07-16 | End: 2025-07-18

## 2025-07-15 RX ORDER — DIPHENHYDRAMINE HCL 12.5MG/5ML
25 ELIXIR ORAL EVERY 6 HOURS
Refills: 0 | Status: DISCONTINUED | OUTPATIENT
Start: 2025-07-15 | End: 2025-07-18

## 2025-07-15 RX ORDER — MODIFIED LANOLIN 100 %
1 CREAM (GRAM) TOPICAL EVERY 6 HOURS
Refills: 0 | Status: DISCONTINUED | OUTPATIENT
Start: 2025-07-15 | End: 2025-07-18

## 2025-07-15 RX ORDER — MAGNESIUM HYDROXIDE 400 MG/5ML
30 SUSPENSION ORAL
Refills: 0 | Status: DISCONTINUED | OUTPATIENT
Start: 2025-07-15 | End: 2025-07-18

## 2025-07-15 RX ORDER — CITRIC ACID/SODIUM CITRATE 300-500 MG
15 SOLUTION, ORAL ORAL ONCE
Refills: 0 | Status: COMPLETED | OUTPATIENT
Start: 2025-07-15 | End: 2025-07-15

## 2025-07-15 RX ORDER — CEFAZOLIN SODIUM IN 0.9 % NACL 3 G/100 ML
2000 INTRAVENOUS SOLUTION, PIGGYBACK (ML) INTRAVENOUS ONCE
Refills: 0 | Status: COMPLETED | OUTPATIENT
Start: 2025-07-15 | End: 2025-07-15

## 2025-07-15 RX ORDER — NALOXONE HYDROCHLORIDE 0.4 MG/ML
0.1 INJECTION, SOLUTION INTRAMUSCULAR; INTRAVENOUS; SUBCUTANEOUS
Refills: 0 | Status: DISCONTINUED | OUTPATIENT
Start: 2025-07-15 | End: 2025-07-16

## 2025-07-15 RX ORDER — OXYCODONE HYDROCHLORIDE 30 MG/1
5 TABLET ORAL ONCE
Refills: 0 | Status: DISCONTINUED | OUTPATIENT
Start: 2025-07-15 | End: 2025-07-18

## 2025-07-15 RX ADMIN — SODIUM CHLORIDE 200 MILLILITER(S): 9 INJECTION, SOLUTION INTRAVENOUS at 12:23

## 2025-07-15 RX ADMIN — Medication 15 MILLILITER(S): at 13:02

## 2025-07-15 RX ADMIN — Medication 20 MILLIGRAM(S): at 13:02

## 2025-07-15 RX ADMIN — Medication 1 APPLICATION(S): at 12:30

## 2025-07-15 NOTE — OB PROVIDER H&P - ATTENDING COMMENTS
Pt here for primary elective  delivery and bilateral salpingectomy. Reviewed risks of procedure including bleeding, infection and damage to the surrounding structures and subsequent need for repair of these structures. Reviewed salpingectomy and that this is irreversible and pt confirms she would like permanent sterilization.     Helen Leon, DO

## 2025-07-15 NOTE — PRE-ANESTHESIA EVALUATION ADULT - NSANTHPMHFT_GEN_ALL_CORE
as above;   2018, epidural w/o cx's    epidural cx'd by shoulder dystocia  s/p odontectomy  chronic pelvic floor pain rx'd w/ PT  denies other siginf. PMH/PSH

## 2025-07-15 NOTE — OB RN PATIENT PROFILE - NSGBSSTATUS_OBGYN_ALL_OB
[facility]  Office: [address], DO, Kat Jensen, DO, Thang Garza, DO, Amy Figueroawood Blood, DO, Luz Elena Robins MD, Rosy Ivy MD, Leila Norton MD, Zuleyka Mayes MD, Quin Shields MD, Anna Montoya MD, Sandeep Rivera MD, Radha Phipps MD, Contreras Pathak DO, Danika Paz MD, Demetria Gibson DO, Heather Michael MD,  Vinod López DO, Chilo Fonseca MD, Mely Edwards MD, Sachin De La Garza MD, Lakisha Bundy MD, Nedra Huber, [facility], [facility], [facility], Ced Patel, CNP, Mendez Corea, CNS, Mary Alice Santos, CNP, Indy Wei, CNP, Chelly Zamora, CNP, Johnna Phelan, CNP, Sunday Solo, CNP, Roxana Garsia PA-C, Joaquim Essex, [facility], Steven Mckeon, CNP, Johnny Jay, CNP, Paulina Duque, CNP, Cuong Carter, CNP, Chon Rodriguez, CNP, Glenis Penn, [address] [place] [address]    Second Visit Note  For more detailed information please refer to the progress note of the day      4/17/2021    12:58 PM    Name:   Neptali Melendrez  MRN:     [id number]     Acct:      [de-identified]   Room:   2024/2024-01   Day:  0  Admit Date:  4/16/2021 10:30 PM    PCP:   No primary care provider on file. Code Status:  Full Code      Pt vitals were reviewed   New labs were reviewed   Patient was seen    Updated plan :     1. Called to bedside by nurse for patient's demands of \"wanting to see the doctor. \"  Patient refused getting her BP read, she is still c/o pain despite receiving 2 Percocets. She doesn't think we are treating her clots. - I explained that she is on Lovenox BID for the DVT/ PE. She needs to stay to get transitioned to oral anticoagulation. She needs to see Pulmonary.    - Pt encouraged to comply with nursing, brandy with vital signs. Pt is so drowsy after receiving IV Phenergan and Percocet that she fell asleep as I was talking to her. Only give IV Morphine prn for breakthrough pain when pt is awake.    systolic, given IV Hydralazine.   Vitals stable    DW nurse      Mavis Choi MD  4/17/2021  12:58 PM Positive

## 2025-07-15 NOTE — OB PROVIDER DELIVERY SUMMARY - NSSELHIDDEN_OBGYN_ALL_OB_FT
[NS_DeliveryAttending1_OBGYN_ALL_OB_FT:[email or web address]=],[NS_DeliveryRN_OBGYN_ALL_OB_FT:[email or web address]=],[NS_DeliveryAssist1_OBGYN_ALL_OB_FT:[id number]=]

## 2025-07-15 NOTE — OB PROVIDER H&P - NSHPPHYSICALEXAM_GEN_ALL_CORE
Gen: NAD  CV: clinically well perfused  Pulm: unlabored respirations  Abd: soft, NT, ND, gravid, no rebound or guarding  SVE: deferred  FHT: baseline 135, mod shaq, + accels, - decels  TOCO: >10min apart  Sono: cephalic, fundal placenta, FH visualized

## 2025-07-15 NOTE — OB PROVIDER DELIVERY SUMMARY - NSPROVIDERDELIVERYNOTE_OBGYN_ALL_OB_FT
scheduled pLTCS and bilateral salpingectomy  for h/o shoulder dystocia   Viable female infant    Hysterotomy closed in 1 layer using PDS   Surgicell powder placed atop hysterotomy  Grossly normal uterus, tubes, and ovaries  Abdomen closed in standard fashion  Pt to recovery in stable condition    Elevated EBL of 1L secondary to increased vascularity.   1cm of R fallopian tube portion remaining secondary to large vessel adjacent to that portion of fallopian tube.     EBL: 1L   IVF: 2L   UOP: 569cc    Tran Hartman, PGY2 scheduled pLTCS and bilateral salpingectomy  for h/o shoulder dystocia   Viable female infant    Hysterotomy closed in 1 layer using PDS   Surgicell powder and interceed placed atop hysterotomy  Grossly normal uterus, tubes, and ovaries  Abdomen closed in standard fashion  Pt to recovery in stable condition    Elevated EBL of 1L secondary to increased vascularity.   ~1cm of R fallopian tube portion remaining secondary to large vessel adjacent to that portion of fallopian tube.     EBL: 1L   IVF: 2L   UOP: 569cc    Tran Hartman, PGY2

## 2025-07-15 NOTE — OB RN INTRAOPERATIVE NOTE - NSSELHIDDEN_OBGYN_ALL_OB_FT
[NS_DeliveryAttending1_OBGYN_ALL_OB_FT:[email or web address]=],[NS_DeliveryRN_OBGYN_ALL_OB_FT:[email or web address]=] [NS_DeliveryAttending1_OBGYN_ALL_OB_FT:[email or web address]=],[NS_DeliveryRN_OBGYN_ALL_OB_FT:[email or web address]=],[NS_DeliveryAssist1_OBGYN_ALL_OB_FT:[id number]=]

## 2025-07-15 NOTE — OB PROVIDER H&P - PRO HIV INFANT
Recd report from Lazarus MACIEL  Assuming patient care at this time  IVF infusing per MAR   negative

## 2025-07-15 NOTE — OB RN PATIENT PROFILE - NSICDXPASTMEDICALHX_GEN_ALL_CORE_FT
PAST MEDICAL HISTORY:  Currently pregnant     History of shoulder dystocia in prior pregnancy     Myalgia of pelvic floor     Normal vaginal delivery of second pregnancy      PAST MEDICAL HISTORY:  Currently pregnant     History of shoulder dystocia in prior pregnancy     Myalgia of pelvic floor     Normal vaginal delivery of second pregnancy     Post partum depression

## 2025-07-15 NOTE — OB PROVIDER DELIVERY SUMMARY - NSPOSITIONATDELIA_OBGYN_ALL_OB
Discharge Instructions  Ambulation encouraged full weightbearing.  Walker or crutches for safety and comfort.  Dressing changes at least every other day, daily preferred.  Wash incision daily with soap and water.  Okay to sleep on stomach, side or back as desired.  If laying on side utilize bulky pillow between knees to reduce discomfort.  Follow-up with Dr. Chanel in office 2 weeks after surgery.  Call office to schedule appointment date and time.  Follow-up with primary care physician 3-6 weeks post surgery for general medical checkup.      SMOKING  Smoking before or after surgery can cause;         a.  Non-Union:  bones that do not heal together resulting in need for further surgery.  Smokers increase this risk by 16 times.       b.  Necrosis of the skin--smoking decreases blood flow to the incision and may cause the skin to die.    We want the best possible outcome for your surgery and smoking will severely compromise it.      Tips for Preventing Falls in the Home  Floors  Make floors safer by doing the following:   Remove throw rugs.  Keep floors and stairs free of clutter and cords.  Arrange furniture so there are clear pathways.  Clean up any spills right away.  Wear shoes that fit.  Bathrooms    Make bathrooms safer by doing the following:   Install grab bars in the tub or shower.  Apply nonskid strips or put a nonskid rubber mat in the tub or shower.  Sit on a bath chair to bathe.  Use bathmats with nonskid backing.  Raised Toilet Seat with or without arms as recommended   Lighting and the environment  Improve lighting in your home by doing the following:   Keep a flashlight in each room.  Put nightlights in the bedrooms, hallways, kitchen, and bathrooms.  Make sure all stairways have good lighting.  Take your time when going up and down stairs.  Move or rearrange items that you use often. This will make them easier to find or reach.  Look at your home to find any safety hazards. Especially look at doorways,  walkways, and the driveway. Remove or repair any safety problems that you find.  Be careful of uneven walking surfaces  Use your mobility device (walker or cane) as instructed    Preventing Surgical Site Infections     Hand washing reduces the risk of infection.      One risk of having surgery is an infection at the surgical cut the surgeon makes in the skin to do the operation. Surgical site infections can range from minor to severe.    What causes surgical site infections?  Germs are everywhere. They’re on your skin, in the air, and on things you touch. Many germs are good. Some are harmful. Surgical site infections occur when harmful germs enter your body through the incision in your skin. Some infections are caused by germs that are in the air or on objects. But most are caused by germs found on and in your own body.    What you can do to prevent surgical site infections    Wash your hands before and after touching your incision.  Keep your incision clean and dry.  Don’t pick at scabs. They help protect the wound.  You will be provided a special cleanser, Hibiclens.(See topic Hibiclens).  Leave the dressing (bandage) in place until you are told to remove it or change it. Change it only as directed, using clean hands.  If you smoke, stop for the longest duration possible before and after the operation. Ask your doctor about ways to quit.  After surgery, eat healthy foods.   Care for your incision as directed by your doctor or nurse.    What are the symptoms of a surgical site infection and when to call your surgeon  Call your surgeon if you have any of the following:  With increased skin redness, pain, and swelling/puffiness around the incision. Later you may notice a cloudy or greenish-yellow drainage from the incision and it may develop a foul odor. Increased soreness, pain, or tenderness at the surgical site. The incision may separate or open up. You are also likely to have a fever (Fever of 100.4°F (38°C) or  higher) , and may feel ill.  Symptoms can appear any time from hours to weeks after surgery.    HIBICLENS           Medicine for Pain  Medicines can help to block pain, decrease inflammation, and treat related problems. More than one medicine may be used to treat your pain. Medicines may be changed as you feel better, or if they cause side effects.    Medicines What they do Possible side effects       Opioids     (Morphine, Dilaudid, Tramadol, Oxycodone, Percocet, Norco)   Reduce feelings or perception of pain. Used for moderate to severe pain. Nausea, vomiting, itching, drowsiness, constipation, slowed breathing   Non-opioid NSAIDs    (aspirin, acetaminophen, Celebrex, Ibuprofen) Reduce pain chemicals at the site of pain. NSAIDs can reduce joint and soft tissue inflammation. Nausea, stomach pain, ulcers, indigestion, bleeding, kidney, and liver problems. Certain NSAIDs may increase the risk for cardiovascular disease in some people. Talk with your healthcare provider.         Anesthetics     (local, injected) include lidocaine, benzocaine, and medicines used by anesthesiologists Stop pain signals from reaching the brain by blocking feeling in the treated area Nausea, low blood pressure, fever, slowed breathing, fainting, seizures, heart attack         When to call your surgeon regarding pain management  Call your healthcare provider right away (or have a family member call) if you have:  Unrelieved pain  Side effects, including constipation or uncontrolled nausea, that interfere with daily activities  If you have extreme sleepiness or breathing problems, call 911.   Other precautions  Ask your healthcare provider or pharmacist how to get rid of your pain medicines safely when you stop using them.  Never share your pain medicines with anyone.  Store your medicines in a safe place so they can’t be stolen. If you think your medicine has been stolen or lost, tell your healthcare provider right away.    Ice  Applying  ice helps prevent swelling and reduce pain. Don't place ice directly on your skin.  Wrap a cold pack or bag of ice in a thin cloth. Place it over the injured area.  Ice for 10 minutes every 3 hours as needed. Don't ice for more than 20 minutes at a time.      Post-Hip Replacement: Heel Slides, Abduction, Adduction  The following exercises can be done in bed. Some help improve blood flow. Others help build strength. Your physical therapist (PT) or surgeon may give you special instructions. Otherwise, repeat each exercise 10 times. Do them at least 2 times each day.    Heel slides  Keep the heel of your operated leg on the bed. Then slide the heel toward your buttocks as far as you comfortably can.  Hold for 5 seconds. Then slide your heel back.       Abduction/adduction  Start with your feet slightly apart. Keeping your knee and foot pointing toward the ceiling, slowly slide your operated leg out to the side.  Slide your leg back to its starting position without crossing the midline of your body.    Discharge Instructions: Using an Incentive Spirometer  An incentive spirometer is a device that helps you do deep breathing exercises. These exercises will help you breathe better and improve the function of your lungs. The incentive spirometer provides a way for you to take an active part in your recovery.  Follow these steps to use your incentive spirometer     Inhale normally. Relax and breathe out.  Place your lips tightly around the mouthpiece.  Make sure the device is upright and not tilted.  Inhale as much air as you can through the mouthpiece (don't breathe through your nose). If you inhale too quickly, the spirometer may make a noise. If you hear this noise, inhale more slowly.  Hold your breath long enough to keep the balls (or disk) raised for at least 5 seconds.  Do this exercise 10 times every hour while you’re awake, or as often as your health care provider instructs.  If you were also taught coughing  exercises, perform them regularly as instructed.      Occiput Anterior

## 2025-07-15 NOTE — OB PROVIDER H&P - NSICDXPASTMEDICALHX_GEN_ALL_CORE_FT
PAST MEDICAL HISTORY:  Currently pregnant     History of shoulder dystocia in prior pregnancy     Myalgia of pelvic floor     Normal vaginal delivery of second pregnancy     Post partum depression

## 2025-07-15 NOTE — OB RN PATIENT PROFILE - FUNCTIONAL ASSESSMENT - DAILY ACTIVITY 6.
Addendum to previous telephone encounter by this writer.     When Maico called patient back she stated she would like to keep her appointment on 2/22/17. She did not have an appointment schedule on 2/27/17.   
4 = No assist / stand by assistance

## 2025-07-15 NOTE — OB PROVIDER H&P - ASSESSMENT
33yo  @39w3d p/f scheduled pLTCS.  - admit to L&D  - GBS pos  - EFW 3320g  - Reglan/Pepcid/Bicitra  - Routine Labs  - FHT/TOCO  - Anesthesia Consult  - for LTCS    Plan per Dr. Carolyn Dawson [facility] PGY2

## 2025-07-15 NOTE — OB PROVIDER H&P - HISTORY OF PRESENT ILLNESS
33yo  @39w3d p/f scheduled pLTCS for h/o shoulder dystocia.  PNC: unc  GBS: pos  EFW: 3320g (extrapolated from 36w US)  ObHx: 2018 FT  4#12 ?shoulder;  FT  3560g c/b shoulder and 3rd degree lac  GynHx: h/o ovarian cysts  MedHx: denies  PSHx: s/p wisdom teeth removal  PsychHx: PPD after 2nd delivery  SocialHx: denies  AllergyHx: NKDA  RxHx: PNV, ASA

## 2025-07-15 NOTE — OB RN DELIVERY SUMMARY - NS_INTRAPARTUMABXTYPE_OBGYN_ALL_OB
Patient walked is requesting left breast mammogram and ultrasound.  H/o right mastectomy for breast cancer.  Orders placed. Please process referrals. Thank you.   No antibiotics or any antibiotics < 2 hrs prior to birth

## 2025-07-15 NOTE — OB RN PREOPERATIVE CHECKLIST - ASSESSMENT, HISTORY & PHYSICAL COMPLETED AND ON MEDICAL RECORD
Assessment/Plan:    Diagnoses and all orders for this visit:    Viral illness        Discussed symptoms and exam findings. Exam benign, no signs of infection at this time requiring further prescription medication.   Rest and drink plenty of fluids. A cool mist humidifier can be helpful. If you develop a prolonged high fever, worsening cough, any new or concerning symptoms please call our office or proceed ER.     Subjective:     History provided by: father    Patient ID: Medardo Crawford is a 8 y.o. male    Was diagnosed with strep throat about 2 weeks ago on 3/18. Did not complete all doses of antibiotics then but felt better. Started with fever again yesterday max 101.9. Took last 2 doses of antibiotics. Denies any complaints at this time. No fever today.       The following portions of the patient's history were reviewed and updated as appropriate: allergies, current medications, past family history, past medical history, past social history, past surgical history, and problem list.    Review of Systems   Constitutional: Negative.    HENT: Negative.  Negative for rhinorrhea and sore throat.    Eyes: Negative.    Respiratory: Negative.  Negative for cough.    Cardiovascular: Negative.    Gastrointestinal: Negative.  Negative for abdominal pain, diarrhea and nausea.   Endocrine: Negative.    Genitourinary: Negative.  Negative for decreased urine volume.   Musculoskeletal: Negative.    Skin: Negative.  Negative for rash.   Allergic/Immunologic: Negative.    Neurological: Negative.    Hematological: Negative.    Psychiatric/Behavioral: Negative.     All other systems reviewed and are negative.    Objective:    Vitals:    04/02/24 1602   BP: 102/60   Pulse: 80   Resp: 16   Temp: 97.7 °F (36.5 °C)   Weight: 45.8 kg (101 lb)     Physical Exam  Vitals and nursing note reviewed. Exam conducted with a chaperone present (Dad in room providing history).   Constitutional:       General: He is active. He is not in acute  distress.     Appearance: Normal appearance. He is not toxic-appearing.   HENT:      Head: Normocephalic and atraumatic.      Right Ear: Tympanic membrane, ear canal and external ear normal. Tympanic membrane is not erythematous.      Left Ear: Tympanic membrane, ear canal and external ear normal. Tympanic membrane is not erythematous.      Nose: Nose normal. No congestion or rhinorrhea.      Mouth/Throat:      Mouth: Mucous membranes are moist.      Pharynx: Oropharynx is clear. Posterior oropharyngeal erythema (slight) present. No oropharyngeal exudate or pharyngeal petechiae.      Tonsils: 2+ on the right. 2+ on the left.   Eyes:      Extraocular Movements: Extraocular movements intact.      Conjunctiva/sclera: Conjunctivae normal.      Pupils: Pupils are equal, round, and reactive to light.   Cardiovascular:      Rate and Rhythm: Normal rate and regular rhythm.      Pulses: Normal pulses.      Heart sounds: Normal heart sounds. No murmur heard.  Pulmonary:      Effort: Pulmonary effort is normal.      Breath sounds: Normal breath sounds.   Abdominal:      General: Abdomen is flat. Bowel sounds are normal.      Palpations: Abdomen is soft.   Musculoskeletal:         General: Normal range of motion.      Cervical back: Normal range of motion and neck supple.   Skin:     General: Skin is warm and dry.      Capillary Refill: Capillary refill takes less than 2 seconds.   Neurological:      General: No focal deficit present.      Mental Status: He is alert.   Psychiatric:         Mood and Affect: Mood normal.         Behavior: Behavior normal.          done

## 2025-07-15 NOTE — OB RN DELIVERY SUMMARY - NS_SEPSISRSKCALC_OBGYN_ALL_OB_FT
EOS calculated successfully. EOS Risk Factor: 0.5/1000 live births ([facility] national incidence); GA=39w3d; Temp=98.4; ROM=0.017; GBS='Positive'; Antibiotics='No antibiotics or any antibiotics < 2 hrs prior to birth'

## 2025-07-15 NOTE — OB RN PATIENT PROFILE - PRO INTERPRETER NEED 2
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    Vision abnormalities        Past Surgical History:   Procedure Laterality Date    ADENOIDECTOMY      APPLICATION OF WOUND VACUUM-ASSISTED CLOSURE DEVICE N/A 10/18/2024    Procedure: APPLICATION, WOUND VAC;  Surgeon: Anna Marie Mo MD;  Location: [facility];  Service: Colon and Rectal;  Laterality: N/A;    BACLOFEN PUMP IMPLANTATION N/A 2020    Procedure: INSERTION, INTRATHECAL BACLOFEN PUMP;  Surgeon: Robbi Cain MD;  Location: [facility] OR 2ND FLR;  Service: Neurosurgery;  Laterality: N/A;  toronto I, asa 1, lateral left down, regular bed reversed, christine, [employer] rep, co surgeon DR Miller    BACLOFEN PUMP IMPLANTATION  2020    CIRCUMCISION      EXCISION, SMALL INTESTINE N/A 10/18/2024    Procedure: EXCISION, SMALL INTESTINE;  Surgeon: Anna Marie Mo MD;  Location: [facility];  Service: Colon and Rectal;  Laterality: N/A;    EYE SURGERY      as a     GASTROSTOMY TUBE PLACEMENT      HERNIA REPAIR      HIP SURGERY      INJECTION OF ANESTHETIC AGENT INTO TISSUE PLANE DEFINED BY TRANSVERSUS ABDOMINIS MUSCLE N/A 10/18/2024    Procedure: BLOCK, TRANSVERSUS ABDOMINIS PLANE;  Surgeon: Anna Marie Mo MD;  Location: [facility];  Service: Colon and Rectal;  Laterality: N/A;    LAPAROTOMY, EXPLORATORY N/A 10/18/2024    Procedure: LAPAROTOMY, EXPLORATORY;  Surgeon: Anna Marie Mo MD;  Location: [facility];  Service: Colon and Rectal;  Laterality: N/A;  Lysis of adhesions    LAPAROTOMY, EXPLORATORY N/A 10/21/2024    Procedure: LAPAROTOMY, EXPLORATORY, OPEN GASTROSTOMY TUBE;  Surgeon: Anna Marie Mo MD;  Location: [facility];  Service: Colon and Rectal;  Laterality: N/A;    LUMBAR  PUNCTURE WITH INJECTION OF BACLOFEN N/A 3/11/2020    Procedure: LUMBAR PUNCTURE, WITH BACLOFEN INJECTION;  Surgeon: Cristiane Sky MD;  Location: [facility] OR 2ND FLR;  Service: Neurosurgery;  Laterality: N/A;  toronto I, asa 1, lateral left down, regular bed reversed , christine , [employer] co surgeon DR Miller    NISSEN FUNDOPLICATION      PARTIAL GASTRECTOMY N/A 10/18/2024    Procedure: GASTRECTOMY, PARTIAL;  Surgeon: Anna Marie Mo MD;  Location: [facility];  Service: Colon and Rectal;  Laterality: N/A;    REMOVAL OF BACLOFEN PUMP Right 10/15/2020    Procedure: REMOVAL, BACLOFEN PUMP;  Surgeon: Marcy Macedo MD;  Location: [facility] OR 2ND FLR;  Service: Neurosurgery;  Laterality: Right;    TONSILLECTOMY      TYMPANOSTOMY TUBE PLACEMENT         Review of patient's allergies indicates:   Allergen Reactions    Strawberries [strawberry] Hives     STRAWBERRIES ALLERGIC REACTIONS   (SEIZURES AND HIVES )       Family History       Problem Relation (Age of Onset)    Cancer Maternal Grandmother          Tobacco Use    Smoking status: Never    Smokeless tobacco: Never   Substance and Sexual Activity    Alcohol use: No    Drug use: No    Sexual activity: Never         Review of Systems   Unable to perform ROS: Patient nonverbal     Objective:     Vital Signs (Most Recent):    Vital Signs (24h Range):  Temp:  [99.2 °F (37.3 °C)] 99.2 °F (37.3 °C)  Pulse:  [94] 94  Resp:  [21] 21  SpO2:  [100 %] 100 %  BP: (112)/(66) 112/66        There is no height or weight on file to calculate BMI.    No intake or output data in the 24 hours ending 12/09/24 2116     Physical Exam  Vitals and nursing note reviewed.   Constitutional:       General: He is not in acute distress.     Appearance: He is ill-appearing.          Comments: Contracted, frail, emaciated   Eyes:      Conjunctiva/sclera: Conjunctivae normal.   Neck:     Cardiovascular:      Rate and Rhythm: Regular rhythm. Tachycardia present.      Pulses:           Radial pulses are 2+ on the  right side and 2+ on the left side.        Dorsalis pedis pulses are 1+ on the right side and 1+ on the left side.   Pulmonary:      Effort: Pulmonary effort is normal.      Breath sounds: Normal breath sounds.   Abdominal:      General: Bowel sounds are normal. There is no distension.      Palpations: Abdomen is soft.       Musculoskeletal:      Right lower leg: No edema.      Left lower leg: No edema.   Skin:     General: Skin is warm and dry.      Capillary Refill: Capillary refill takes less than 2 seconds.   Neurological:      Mental Status: Mental status is at baseline.      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     English

## 2025-07-16 ENCOUNTER — TRANSCRIPTION ENCOUNTER (OUTPATIENT)
Age: 35
End: 2025-07-16

## 2025-07-16 LAB
HCT VFR BLD CALC: 30.9 % — LOW (ref 34.5–45)
HGB BLD-MCNC: 10.4 G/DL — LOW (ref 11.5–15.5)
MCHC RBC-ENTMCNC: 28.9 PG — SIGNIFICANT CHANGE UP (ref 27–34)
MCHC RBC-ENTMCNC: 33.7 G/DL — SIGNIFICANT CHANGE UP (ref 32–36)
MCV RBC AUTO: 85.8 FL — SIGNIFICANT CHANGE UP (ref 80–100)
NRBC # BLD AUTO: 0 K/UL — SIGNIFICANT CHANGE UP (ref 0–0)
NRBC # FLD: 0 K/UL — SIGNIFICANT CHANGE UP (ref 0–0)
NRBC BLD AUTO-RTO: 0 /100 WBCS — SIGNIFICANT CHANGE UP (ref 0–0)
PLATELET # BLD AUTO: 194 K/UL — SIGNIFICANT CHANGE UP (ref 150–400)
PMV BLD: 10 FL — SIGNIFICANT CHANGE UP (ref 7–13)
RBC # BLD: 3.6 M/UL — LOW (ref 3.8–5.2)
RBC # FLD: 14.1 % — SIGNIFICANT CHANGE UP (ref 10.3–14.5)
T PALLIDUM AB TITR SER: NEGATIVE — SIGNIFICANT CHANGE UP
WBC # BLD: 14.78 K/UL — HIGH (ref 3.8–10.5)
WBC # FLD AUTO: 14.78 K/UL — HIGH (ref 3.8–10.5)

## 2025-07-16 PROCEDURE — 85027 COMPLETE CBC AUTOMATED: CPT

## 2025-07-16 PROCEDURE — 86780 TREPONEMA PALLIDUM: CPT

## 2025-07-16 PROCEDURE — 86850 RBC ANTIBODY SCREEN: CPT

## 2025-07-16 PROCEDURE — 86900 BLOOD TYPING SEROLOGIC ABO: CPT

## 2025-07-16 PROCEDURE — 86901 BLOOD TYPING SEROLOGIC RH(D): CPT

## 2025-07-16 RX ORDER — SODIUM CHLORIDE 9 G/1000ML
1000 INJECTION, SOLUTION INTRAVENOUS ONCE
Refills: 0 | Status: DISCONTINUED | OUTPATIENT
Start: 2025-07-16 | End: 2025-07-18

## 2025-07-16 RX ORDER — IBUPROFEN 200 MG
600 TABLET ORAL EVERY 6 HOURS
Refills: 0 | Status: DISCONTINUED | OUTPATIENT
Start: 2025-07-16 | End: 2025-07-18

## 2025-07-16 RX ORDER — HEPARIN SODIUM 1000 [USP'U]/ML
5000 INJECTION INTRAVENOUS; SUBCUTANEOUS EVERY 12 HOURS
Refills: 0 | Status: DISCONTINUED | OUTPATIENT
Start: 2025-07-16 | End: 2025-07-18

## 2025-07-16 RX ADMIN — HEPARIN SODIUM 5000 UNIT(S): 1000 INJECTION INTRAVENOUS; SUBCUTANEOUS at 18:27

## 2025-07-16 RX ADMIN — KETOROLAC TROMETHAMINE 30 MILLIGRAM(S): 30 INJECTION, SOLUTION INTRAMUSCULAR; INTRAVENOUS at 03:10

## 2025-07-16 RX ADMIN — OXYCODONE HYDROCHLORIDE 5 MILLIGRAM(S): 30 TABLET ORAL at 23:38

## 2025-07-16 RX ADMIN — KETOROLAC TROMETHAMINE 30 MILLIGRAM(S): 30 INJECTION, SOLUTION INTRAMUSCULAR; INTRAVENOUS at 03:40

## 2025-07-16 RX ADMIN — Medication 975 MILLIGRAM(S): at 00:20

## 2025-07-16 RX ADMIN — Medication 975 MILLIGRAM(S): at 06:15

## 2025-07-16 RX ADMIN — Medication 975 MILLIGRAM(S): at 11:45

## 2025-07-16 RX ADMIN — Medication 975 MILLIGRAM(S): at 18:25

## 2025-07-16 RX ADMIN — KETOROLAC TROMETHAMINE 30 MILLIGRAM(S): 30 INJECTION, SOLUTION INTRAMUSCULAR; INTRAVENOUS at 10:06

## 2025-07-16 RX ADMIN — KETOROLAC TROMETHAMINE 30 MILLIGRAM(S): 30 INJECTION, SOLUTION INTRAMUSCULAR; INTRAVENOUS at 21:00

## 2025-07-16 RX ADMIN — HEPARIN SODIUM 5000 UNIT(S): 1000 INJECTION INTRAVENOUS; SUBCUTANEOUS at 06:15

## 2025-07-16 RX ADMIN — KETOROLAC TROMETHAMINE 30 MILLIGRAM(S): 30 INJECTION, SOLUTION INTRAMUSCULAR; INTRAVENOUS at 20:13

## 2025-07-16 RX ADMIN — Medication 80 MILLIGRAM(S): at 18:26

## 2025-07-16 RX ADMIN — Medication 975 MILLIGRAM(S): at 23:28

## 2025-07-16 RX ADMIN — KETOROLAC TROMETHAMINE 30 MILLIGRAM(S): 30 INJECTION, SOLUTION INTRAMUSCULAR; INTRAVENOUS at 15:05

## 2025-07-16 NOTE — DISCHARGE NOTE OB - CARE PROVIDER_API CALL
Helen Leon)  Obstetrics & Gynecology  [address], [address]  [place] [address]  Phone: [phone]  Fax: [phone]  Follow Up Time:

## 2025-07-16 NOTE — DISCHARGE NOTE OB - CARE PLAN
Principal Discharge DX:	 delivery delivered  Assessment and plan of treatment:	routine postop care   1

## 2025-07-16 NOTE — DISCHARGE NOTE OB - SEVERE ABDOMINAL/VAGINAL AND/OR RECTAL PAIN
Patient was referred by inpatient case management for complex care management  Early care management referrals were declined as he is a hem/onc patient  Due 8 admissions & 1 ED visit in the past year, the patient does meet rising risk criteria  Patient was current with [facility] care coordination  Left a message for Lin Oneal, RN case manager with [facility]       Patient placed in surveillance at this time & Matilde Coelho CM, added to the care team  In basket sent to Ana Rosa Burns with referral  Statement Selected

## 2025-07-16 NOTE — DISCHARGE NOTE OB - HOSPITAL COURSE
Pt had a scheduled primary  delivery and bilateral salpingectomy. She was discharged on POD Pt had a scheduled primary  delivery and bilateral salpingectomy. Patient had an uncomplicated postoperative course and she was discharged home in stable condition on POD#3

## 2025-07-16 NOTE — DISCHARGE NOTE OB - NS OB DC TDAP PATIENT INFO
ANTICOAGULATION FOLLOW-UP CLINIC VISIT    Patient Name:  Zoey Jacobs  Date:  2019  Contact Type:  Face to Face    SUBJECTIVE:  Patient Findings         Clinical Outcomes     Negatives:   Major bleeding event, Thromboembolic event, Anticoagulation-related hospital admission, Anticoagulation-related ED visit, Anticoagulation-related fatality           OBJECTIVE    INR Protime   Date Value Ref Range Status   2019 2.0 (A) 0.86 - 1.14 Final       ASSESSMENT / PLAN  INR assessment THER    Recheck INR In: 4 WEEKS    INR Location Clinic      Anticoagulation Summary  As of 2019    INR goal:   2.0-3.0   TTR:   70.6 % (6.7 y)   INR used for dosin.0 (2019)   Warfarin maintenance plan:   5 mg (5 mg x 1) every Mon, Wed, Fri; 2.5 mg (5 mg x 0.5) all other days   Full warfarin instructions:   : 5 mg; Otherwise 5 mg every Mon, Wed, Fri; 2.5 mg all other days   Weekly warfarin total:   25 mg   Plan last modified:   Maryuri Vines RN (3/19/2018)   Next INR check:   2019   Priority:   INR   Target end date:   Indefinite    Indications    Anticoagulation monitoring  INR range 2-3 [Z79.01]  Paroxysmal atrial fibrillation (H) [I48.0]             Anticoagulation Episode Summary     INR check location:       Preferred lab:       Send INR reminders to:    INR    Comments:         Anticoagulation Care Providers     Provider Role Specialty Phone number    Vijay Mackay MD [facility] [phone]            See the Encounter Report to view Anticoagulation Flowsheet and Dosing Calendar (Go to Encounters tab in chart review, and find the Anticoagulation Therapy Visit)        Maryuri Vines RN                  Risks/benefits discussed with patient or patient surrogate

## 2025-07-16 NOTE — DISCHARGE NOTE OB - FINANCIAL ASSISTANCE
[facility] provides services at a reduced cost to those who are determined to be eligible through [facility]’s financial assistance program. Information regarding [facility]’s financial assistance program can be found by going to [email or web address] or by calling [phone].

## 2025-07-16 NOTE — DISCHARGE NOTE OB - PATIENT PORTAL LINK FT
You can access the FollowMyHealth Patient Portal offered by [facility] by registering at the following website: [email or web address]. By joining [facility]’s FollowMyHealth portal, you will also be able to view your health information using other applications (apps) compatible with our system.

## 2025-07-17 RX ORDER — OXYCODONE HYDROCHLORIDE 30 MG/1
5 TABLET ORAL
Refills: 0 | Status: DISCONTINUED | OUTPATIENT
Start: 2025-07-17 | End: 2025-07-18

## 2025-07-17 RX ADMIN — Medication 600 MILLIGRAM(S): at 21:01

## 2025-07-17 RX ADMIN — Medication 80 MILLIGRAM(S): at 18:39

## 2025-07-17 RX ADMIN — Medication 600 MILLIGRAM(S): at 03:30

## 2025-07-17 RX ADMIN — HEPARIN SODIUM 5000 UNIT(S): 1000 INJECTION INTRAVENOUS; SUBCUTANEOUS at 05:53

## 2025-07-17 RX ADMIN — Medication 975 MILLIGRAM(S): at 05:53

## 2025-07-17 RX ADMIN — OXYCODONE HYDROCHLORIDE 5 MILLIGRAM(S): 30 TABLET ORAL at 02:44

## 2025-07-17 RX ADMIN — Medication 975 MILLIGRAM(S): at 12:45

## 2025-07-17 RX ADMIN — Medication 600 MILLIGRAM(S): at 09:20

## 2025-07-17 RX ADMIN — Medication 975 MILLIGRAM(S): at 12:15

## 2025-07-17 RX ADMIN — OXYCODONE HYDROCHLORIDE 5 MILLIGRAM(S): 30 TABLET ORAL at 19:09

## 2025-07-17 RX ADMIN — Medication 975 MILLIGRAM(S): at 00:00

## 2025-07-17 RX ADMIN — Medication 600 MILLIGRAM(S): at 09:50

## 2025-07-17 RX ADMIN — OXYCODONE HYDROCHLORIDE 5 MILLIGRAM(S): 30 TABLET ORAL at 00:00

## 2025-07-17 RX ADMIN — OXYCODONE HYDROCHLORIDE 5 MILLIGRAM(S): 30 TABLET ORAL at 03:30

## 2025-07-17 RX ADMIN — Medication 600 MILLIGRAM(S): at 16:28

## 2025-07-17 RX ADMIN — Medication 600 MILLIGRAM(S): at 02:44

## 2025-07-17 RX ADMIN — OXYCODONE HYDROCHLORIDE 5 MILLIGRAM(S): 30 TABLET ORAL at 18:39

## 2025-07-17 RX ADMIN — Medication 975 MILLIGRAM(S): at 06:46

## 2025-07-17 RX ADMIN — Medication 975 MILLIGRAM(S): at 18:58

## 2025-07-17 RX ADMIN — Medication 975 MILLIGRAM(S): at 23:49

## 2025-07-17 RX ADMIN — Medication 600 MILLIGRAM(S): at 15:58

## 2025-07-17 RX ADMIN — OXYCODONE HYDROCHLORIDE 5 MILLIGRAM(S): 30 TABLET ORAL at 22:17

## 2025-07-17 RX ADMIN — Medication 600 MILLIGRAM(S): at 21:45

## 2025-07-17 RX ADMIN — OXYCODONE HYDROCHLORIDE 5 MILLIGRAM(S): 30 TABLET ORAL at 21:59

## 2025-07-17 RX ADMIN — Medication 975 MILLIGRAM(S): at 18:28

## 2025-07-17 RX ADMIN — HEPARIN SODIUM 5000 UNIT(S): 1000 INJECTION INTRAVENOUS; SUBCUTANEOUS at 18:29

## 2025-07-17 NOTE — PROGRESS NOTE ADULT - ATTENDING COMMENTS
Patient seen and examined at bedside, recovering well, no acute complaints. Denies fever, chills, chest pain, SOB, nausea, vomiting, LE pain. Pain well-controlled with medication. Minimal bleeding, voiding, ambulating, passed flatus, tolerating regular diet. Breastfeeding.    Physical Exam  Vital Signs Last 24 Hrs  T(C): 36.8 (17 Jul 2025 05:51), Max: 37 (16 Jul 2025 17:12)  T(F): 98.3 (17 Jul 2025 05:51), Max: 98.6 (16 Jul 2025 17:12)  HR: 81 (17 Jul 2025 05:51) (80 - 84)  BP: 98/63 (17 Jul 2025 05:51) (94/59 - 109/68)  RR: 18 (17 Jul 2025 05:51) (18 - 18)  SpO2: 97% (17 Jul 2025 05:51) (97% - 97%)    Parameters below as of 17 Jul 2025 05:51  Patient On (Oxygen Delivery Method): room air    Gen: AAOx3, NAD  Fundus: firm, below umbilicus   Wound: dressing in place c/d/i   Abd: Soft, nontender, nondistended, BS+  Lochia: minimal  Ext: No calf tenderness, no swelling    Labs:                        10.4   14.78 )-----------( 194      ( 16 Jul 2025 06:43 )             30.9                         11.4   15.25 )-----------( 198      ( 15 Jul 2025 22:43 )             34.4        acetaminophen     Tablet .. 975 milliGRAM(s) Oral <User Schedule>  dexAMETHasone  Injectable 4 milliGRAM(s) IV Push every 6 hours PRN  diphenhydrAMINE 25 milliGRAM(s) Oral every 6 hours PRN  diphtheria/tetanus/pertussis (acellular) Vaccine (Adacel) 0.5 milliLiter(s) IntraMuscular once  heparin   Injectable 5000 Unit(s) SubCutaneous every 12 hours  ibuprofen  Tablet. 600 milliGRAM(s) Oral every 6 hours  lactated ringers Bolus 1000 milliLiter(s) IV Bolus once  lactated ringers. 1000 milliLiter(s) IV Continuous <Continuous>  lanolin Ointment 1 Application(s) Topical every 6 hours PRN  magnesium hydroxide Suspension 30 milliLiter(s) Oral two times a day PRN  nalbuphine Injectable 2.5 milliGRAM(s) IV Push every 6 hours PRN  naloxone Injectable 0.1 milliGRAM(s) IV Push every 3 minutes PRN  ondansetron Injectable 4 milliGRAM(s) IV Push every 6 hours PRN  oxyCODONE    IR 5 milliGRAM(s) Oral every 3 hours PRN  oxyCODONE    IR 5 milliGRAM(s) Oral once PRN  oxytocin Infusion 42 milliUNIT(s)/Min IV Continuous <Continuous>  simethicone 80 milliGRAM(s) Chew every 4 hours PRN      34y now P3 s/p elective pCS, POD#2, recovering well.     - encourage ambulation, PO hydration  - monitor vitals, bleeding   - regular diet  - encourage incentive spirometry   - pain mgmt prn   - simethicone  - routine postop care   - SQH  - anticipate d/c home tomorrow

## 2025-07-18 VITALS
DIASTOLIC BLOOD PRESSURE: 54 MMHG | SYSTOLIC BLOOD PRESSURE: 96 MMHG | OXYGEN SATURATION: 97 % | RESPIRATION RATE: 18 BRPM | HEART RATE: 86 BPM | TEMPERATURE: 98 F

## 2025-07-18 LAB — SURGICAL PATHOLOGY STUDY: SIGNIFICANT CHANGE UP

## 2025-07-18 PROCEDURE — 59025 FETAL NON-STRESS TEST: CPT

## 2025-07-18 PROCEDURE — 85027 COMPLETE CBC AUTOMATED: CPT

## 2025-07-18 PROCEDURE — 86780 TREPONEMA PALLIDUM: CPT

## 2025-07-18 PROCEDURE — C1889: CPT

## 2025-07-18 PROCEDURE — 86850 RBC ANTIBODY SCREEN: CPT

## 2025-07-18 PROCEDURE — 88302 TISSUE EXAM BY PATHOLOGIST: CPT

## 2025-07-18 PROCEDURE — 86900 BLOOD TYPING SEROLOGIC ABO: CPT

## 2025-07-18 PROCEDURE — 59050 FETAL MONITOR W/REPORT: CPT

## 2025-07-18 PROCEDURE — 86901 BLOOD TYPING SEROLOGIC RH(D): CPT

## 2025-07-18 RX ORDER — IBUPROFEN 200 MG
1 TABLET ORAL
Qty: 0 | Refills: 0 | DISCHARGE
Start: 2025-07-18

## 2025-07-18 RX ORDER — ACETAMINOPHEN 500 MG/5ML
3 LIQUID (ML) ORAL
Qty: 0 | Refills: 0 | DISCHARGE
Start: 2025-07-18

## 2025-07-18 RX ADMIN — Medication 600 MILLIGRAM(S): at 10:10

## 2025-07-18 RX ADMIN — Medication 975 MILLIGRAM(S): at 06:09

## 2025-07-18 RX ADMIN — Medication 600 MILLIGRAM(S): at 09:14

## 2025-07-18 RX ADMIN — Medication 600 MILLIGRAM(S): at 02:02

## 2025-07-18 RX ADMIN — Medication 975 MILLIGRAM(S): at 00:30

## 2025-07-18 RX ADMIN — OXYCODONE HYDROCHLORIDE 5 MILLIGRAM(S): 30 TABLET ORAL at 06:10

## 2025-07-18 RX ADMIN — Medication 975 MILLIGRAM(S): at 12:10

## 2025-07-18 RX ADMIN — OXYCODONE HYDROCHLORIDE 5 MILLIGRAM(S): 30 TABLET ORAL at 02:45

## 2025-07-18 RX ADMIN — HEPARIN SODIUM 5000 UNIT(S): 1000 INJECTION INTRAVENOUS; SUBCUTANEOUS at 05:28

## 2025-07-18 RX ADMIN — Medication 975 MILLIGRAM(S): at 11:13

## 2025-07-18 RX ADMIN — OXYCODONE HYDROCHLORIDE 5 MILLIGRAM(S): 30 TABLET ORAL at 02:01

## 2025-07-18 RX ADMIN — Medication 600 MILLIGRAM(S): at 02:44

## 2025-07-18 RX ADMIN — OXYCODONE HYDROCHLORIDE 5 MILLIGRAM(S): 30 TABLET ORAL at 05:28

## 2025-07-18 RX ADMIN — Medication 975 MILLIGRAM(S): at 05:28

## 2025-07-18 NOTE — PROGRESS NOTE ADULT - ASSESSMENT
A/P: 35yo POD#2 s/p  elective pLTCS for h/o shoulder dystocia, with concurrent bilateral salpingectomy.  Patient is stable and doing well post-operatively.    - Continue regular diet.  - Increase ambulation.  - Continue motrin, tylenol, oxycodone PRN for pain control.     Vandana Lund PGY1  
A/P: 33yo POD#3 s/p LTCS c/b PPH.  Patient is stable and is doing well post-operatively.  #POD3  - Continue motrin, tylenol, oxycodone PRN for pain control.  - Increase ambulation  - Continue regular diet  - Discharge planning    #PPH  - EBL 1000  - Hct: 37.8->34.4->30.9  - Asymptomatic, vitals stable for her baseline      Gloria Ty PGY1
A/P: 35yo POD#1 s/p elective pLTCS for h/o shoulder and concurrent bilateral salpingectomy.  Patient is stable and doing well post-operatively.    - Continue regular diet.  - Increase ambulation.  - Continue motrin, tylenol, oxycodone PRN for pain control.    - F/u AM CBC    Vandana Lund PGY1

## 2025-07-18 NOTE — PROGRESS NOTE ADULT - SUBJECTIVE AND OBJECTIVE BOX
OB Postpartum Note:  Delivery, POD#3    S: 33yo P3 POD#3 s/p elective pLTCS. The patient feels well and is looking forward to going home today. Pain is well controlled. She is tolerating a regular diet and passing flatus. She is voiding spontaneously, and ambulating without difficulty. Denies CP/SOB. Denies lightheadedness/dizziness. Denies N/V.    O:  Vitals:  Vital Signs Last 24 Hrs  T(C): 36.9 (2025 06:13), Max: 37.2 (2025 17:15)  T(F): 98.4 (2025 06:13), Max: 98.9 (2025 17:15)  HR: 86 (2025 06:13) (86 - 95)  BP: 96/54 (2025 06:13) (96/54 - 106/66)  BP(mean): --  RR: 18 (2025 06:13) (18 - 18)  SpO2: 97% (2025 06:13) (96% - 97%)    Parameters below as of 2025 06:13  Patient On (Oxygen Delivery Method): room air        MEDICATIONS  (STANDING):  acetaminophen     Tablet .. 975 milliGRAM(s) Oral <User Schedule>  diphtheria/tetanus/pertussis (acellular) Vaccine (Adacel) 0.5 milliLiter(s) IntraMuscular once  heparin   Injectable 5000 Unit(s) SubCutaneous every 12 hours  ibuprofen  Tablet. 600 milliGRAM(s) Oral every 6 hours  lactated ringers Bolus 1000 milliLiter(s) IV Bolus once  lactated ringers. 1000 milliLiter(s) (200 mL/Hr) IV Continuous <Continuous>  oxytocin Infusion 42 milliUNIT(s)/Min (42 mL/Hr) IV Continuous <Continuous>    MEDICATIONS  (PRN):  dexAMETHasone  Injectable 4 milliGRAM(s) IV Push every 6 hours PRN Nausea  diphenhydrAMINE 25 milliGRAM(s) Oral every 6 hours PRN Pruritus  lanolin Ointment 1 Application(s) Topical every 6 hours PRN Sore Nipples  magnesium hydroxide Suspension 30 milliLiter(s) Oral two times a day PRN Constipation  nalbuphine Injectable 2.5 milliGRAM(s) IV Push every 6 hours PRN Pruritus  naloxone Injectable 0.1 milliGRAM(s) IV Push every 3 minutes PRN For ANY of the following changes in patient status:  A. Breaths Per Minute LESS THAN 10, B. Oxygen saturation LESS THAN 90%, C. Sedation score of 6 for Stop After: 4 Times  ondansetron Injectable 4 milliGRAM(s) IV Push every 6 hours PRN Nausea  oxyCODONE    IR 5 milliGRAM(s) Oral once PRN Moderate to Severe Pain (4-10)  oxyCODONE    IR 5 milliGRAM(s) Oral every 3 hours PRN Moderate to Severe Pain (4-10)  simethicone 80 milliGRAM(s) Chew every 4 hours PRN Gas      LABS:  Blood type: O Positive  RPR: Negative                          10.4[L]   14.78[H] >-----------< 194    (  @ 06:43 )             30.9[L]                        11.4[L]   15.25[H] >-----------< 198    ( 07-15 @ 22:43 )             34.4[L]      Physical exam:  Gen: NAD  Abdomen: Soft, nontender, no distension , firm uterine fundus at umbilicus.  Incision: Clean, dry, and intact   Pelvic: Normal lochia noted  Ext: No calf tenderness    
Day 1 of Anesthesia Pain Management Service    SUBJECTIVE:  Pain Scale Score:          [X] Refer to charted pain scores    THERAPY: Received PF spinal morphine as above    OBJECTIVE:    Sedation:        	[X] Alert	[ ] Drowsy	[ ] Arousable      [ ] Asleep       [ ] Unresponsive    Side Effects:	[X] None	[ ] Nausea	[ ] Vomiting         [ ] Pruritus  		[ ] Weakness            [ ] Numbness	          [ ] Other:    ASSESSMENT/ PLAN  [X] Patient transitioned to prn analgesics  [X] Pain management per primary service, pain service to sign off   [X]Documentation and Verification of current medications
Day 1 of Anesthesia Pain Management Service    SUBJECTIVE: Doing ok  Pain Scale Score:          [X] Refer to charted pain scores    THERAPY:    s/p    100 mcg PF morphine on 7\15\25      MEDICATIONS  (STANDING):  acetaminophen     Tablet .. 975 milliGRAM(s) Oral <User Schedule>  diphtheria/tetanus/pertussis (acellular) Vaccine (Adacel) 0.5 milliLiter(s) IntraMuscular once  heparin   Injectable 5000 Unit(s) SubCutaneous every 12 hours  ibuprofen  Tablet. 600 milliGRAM(s) Oral every 6 hours  ketorolac   Injectable 30 milliGRAM(s) IV Push every 6 hours  lactated ringers Bolus 1000 milliLiter(s) IV Bolus once  lactated ringers. 1000 milliLiter(s) (200 mL/Hr) IV Continuous <Continuous>  morphine PF Spinal 0.1 milliGRAM(s) IntraThecal. once  oxytocin Infusion 42 milliUNIT(s)/Min (42 mL/Hr) IV Continuous <Continuous>    MEDICATIONS  (PRN):  dexAMETHasone  Injectable 4 milliGRAM(s) IV Push every 6 hours PRN Nausea  dexAMETHasone  Injectable 4 milliGRAM(s) IV Push every 6 hours PRN Nausea  diphenhydrAMINE 25 milliGRAM(s) Oral every 6 hours PRN Pruritus  lanolin Ointment 1 Application(s) Topical every 6 hours PRN Sore Nipples  magnesium hydroxide Suspension 30 milliLiter(s) Oral two times a day PRN Constipation  nalbuphine Injectable 2.5 milliGRAM(s) IV Push every 6 hours PRN Pruritus  nalbuphine Injectable 2.5 milliGRAM(s) IV Push every 6 hours PRN Pruritus  naloxone Injectable 0.1 milliGRAM(s) IV Push every 3 minutes PRN For ANY of the following changes in patient status:  A. Breaths Per Minute LESS THAN 10, B. Oxygen saturation LESS THAN 90%, C. Sedation score of 6 for Stop After: 4 Times  naloxone Injectable 0.1 milliGRAM(s) IV Push every 3 minutes PRN For ANY of the following changes in patient status:  A. Breaths Per Minute LESS THAN 10, B. Oxygen saturation LESS THAN 90%, C. Sedation score of 6 for Stop After: 4 Times  ondansetron Injectable 4 milliGRAM(s) IV Push every 6 hours PRN Nausea  ondansetron Injectable 4 milliGRAM(s) IV Push every 6 hours PRN Nausea  oxyCODONE    IR 5 milliGRAM(s) Oral every 3 hours PRN Mild Pain (1 - 3)  oxyCODONE    IR 5 milliGRAM(s) Oral every 3 hours PRN Mild Pain (1 - 3)  oxyCODONE    IR 10 milliGRAM(s) Oral every 3 hours PRN Moderate Pain (4 - 6)  oxyCODONE    IR 5 milliGRAM(s) Oral every 3 hours PRN Moderate to Severe Pain (4-10)  oxyCODONE    IR 5 milliGRAM(s) Oral once PRN Moderate to Severe Pain (4-10)  simethicone 80 milliGRAM(s) Chew every 4 hours PRN Gas      OBJECTIVE:    Sedation:        	[X] Alert	 [ ] Drowsy	[ ] Arousable      [ ] Asleep       [ ] Unresponsive    Side Effects:	[ ] None 	[ ] Nausea	[ ] Vomiting         [X ] Pruritus  		[ ] Weakness            [ ] Numbness	          [ ] Other:    Vital Signs Last 24 Hrs  T(C): 36.7 (16 Jul 2025 08:16), Max: 37.1 (16 Jul 2025 05:45)  T(F): 98 (16 Jul 2025 08:16), Max: 98.8 (16 Jul 2025 05:45)  HR: 758 (16 Jul 2025 08:16) (73 - 758)  BP: 94/58 (16 Jul 2025 08:16) (94/57 - 130/62)  BP(mean): 75 (16 Jul 2025 00:15) (73 - 89)  RR: 18 (16 Jul 2025 08:16) (16 - 18)  SpO2: 97% (16 Jul 2025 08:16) (85% - 100%)    Parameters below as of 16 Jul 2025 08:16  Patient On (Oxygen Delivery Method): room air        ASSESSMENT/ PLAN  [X] Patient to be transitioned to prn analgesics after 24 hours  [X] Pain management per primary service, pain service to sign off   [X]Documentation and Verification of current medications
Postpartum Note,  Section   ATTENDING NOTE - Post-operative day 1    Subjective:  The patient feels well. Ambulating without difficulty  Pt is tolerating regular diet. Pain well controlled.  She denies nausea and vomiting.    (  x )Tellez dc'd   awaiting spont void     (   ) Tellez still in place    (  ) tellez dc'd pt already voided  (  ) breastfeeding    She reports normal postpartum bleeding    Physical exam:    Vital Signs Last 24 Hrs  T(C): 36.7 (2025 08:16), Max: 37.1 (2025 05:45)  T(F): 98 (2025 08:16), Max: 98.8 (2025 05:45)  HR: 758 (2025 08:16) (73 - 758)  BP: 94/58 (2025 08:16) (94/57 - 130/62)  BP(mean): 75 (2025 00:15) (73 - 89)  RR: 18 (2025 08:16) (16 - 18)  SpO2: 97% (2025 08:16) (85% - 100%)    Parameters below as of 2025 08:16  Patient On (Oxygen Delivery Method): room air        Gen: NAD  Breast: Soft, nontender, not engorged.  Abdomen: Soft, nontender, no distension , firm uterine fundus at umbilicus -2.  Incision: Clean, dry, and intact with steris  Ext: No calf tenderness, no hyper reflexia, (  )trace (  )1+  edema      LABS:                        10.4   14.78 )-----------( 194      ( 2025 06:43 )             30.9                         11.4   15.25 )-----------( 198      ( 15 Jul 2025 22:43 )             34.4     ABO Interpretation: O (07-15-25 @ 12:34)  Rh Interpretation: Positive (07-15-25 @ 12:34)    Antibody ScreenNegative                Allergies    No Known Allergies    Intolerances      MEDICATIONS  (STANDING):  acetaminophen     Tablet .. 975 milliGRAM(s) Oral <User Schedule>  diphtheria/tetanus/pertussis (acellular) Vaccine (Adacel) 0.5 milliLiter(s) IntraMuscular once  heparin   Injectable 5000 Unit(s) SubCutaneous every 12 hours  ibuprofen  Tablet. 600 milliGRAM(s) Oral every 6 hours  ketorolac   Injectable 30 milliGRAM(s) IV Push every 6 hours  lactated ringers Bolus 1000 milliLiter(s) IV Bolus once  lactated ringers. 1000 milliLiter(s) (200 mL/Hr) IV Continuous <Continuous>  morphine PF Spinal 0.1 milliGRAM(s) IntraThecal. once  oxytocin Infusion 42 milliUNIT(s)/Min (42 mL/Hr) IV Continuous <Continuous>    MEDICATIONS  (PRN):  dexAMETHasone  Injectable 4 milliGRAM(s) IV Push every 6 hours PRN Nausea  dexAMETHasone  Injectable 4 milliGRAM(s) IV Push every 6 hours PRN Nausea  diphenhydrAMINE 25 milliGRAM(s) Oral every 6 hours PRN Pruritus  lanolin Ointment 1 Application(s) Topical every 6 hours PRN Sore Nipples  magnesium hydroxide Suspension 30 milliLiter(s) Oral two times a day PRN Constipation  nalbuphine Injectable 2.5 milliGRAM(s) IV Push every 6 hours PRN Pruritus  nalbuphine Injectable 2.5 milliGRAM(s) IV Push every 6 hours PRN Pruritus  naloxone Injectable 0.1 milliGRAM(s) IV Push every 3 minutes PRN For ANY of the following changes in patient status:  A. Breaths Per Minute LESS THAN 10, B. Oxygen saturation LESS THAN 90%, C. Sedation score of 6 for Stop After: 4 Times  naloxone Injectable 0.1 milliGRAM(s) IV Push every 3 minutes PRN For ANY of the following changes in patient status:  A. Breaths Per Minute LESS THAN 10, B. Oxygen saturation LESS THAN 90%, C. Sedation score of 6 for Stop After: 4 Times  ondansetron Injectable 4 milliGRAM(s) IV Push every 6 hours PRN Nausea  ondansetron Injectable 4 milliGRAM(s) IV Push every 6 hours PRN Nausea  oxyCODONE    IR 5 milliGRAM(s) Oral every 3 hours PRN Mild Pain (1 - 3)  oxyCODONE    IR 5 milliGRAM(s) Oral every 3 hours PRN Mild Pain (1 - 3)  oxyCODONE    IR 10 milliGRAM(s) Oral every 3 hours PRN Moderate Pain (4 - 6)  oxyCODONE    IR 5 milliGRAM(s) Oral every 3 hours PRN Moderate to Severe Pain (4-10)  oxyCODONE    IR 5 milliGRAM(s) Oral once PRN Moderate to Severe Pain (4-10)  simethicone 80 milliGRAM(s) Chew every 4 hours PRN Gas        Assessment and Plan  POD # 1  s/p  section. Stable.  Encourage ambulation  Continue with PCEA/PCA  Regular diet as tolerated  Follow up [facility]    Office [phone]  Dr. Santos                  
S: 33yo POD#2 s/p elective pLTCS for h/o shoulder dystocia, with concurrent bilateral salpingectomy. Pain is well controlled. She is tolerating a regular diet and passing flatus. She is voiding spontaneously, and ambulating without difficulty. Denies CP/SOB. Denies lightheadedness/dizziness. Denies N/V.    O:  Vitals:  Vital Signs Last 24 Hrs  T(C): 37 (16 Jul 2025 21:05), Max: 37.1 (16 Jul 2025 05:45)  T(F): 98.6 (16 Jul 2025 21:05), Max: 98.8 (16 Jul 2025 05:45)  HR: 84 (16 Jul 2025 21:05) (75 - 88)  BP: 109/68 (16 Jul 2025 21:05) (94/57 - 109/68)  BP(mean): --  RR: 18 (16 Jul 2025 21:05) (18 - 18)  SpO2: 97% (16 Jul 2025 21:05) (95% - 97%)    Parameters below as of 16 Jul 2025 21:05  Patient On (Oxygen Delivery Method): room air        MEDICATIONS  (STANDING):  acetaminophen     Tablet .. 975 milliGRAM(s) Oral <User Schedule>  diphtheria/tetanus/pertussis (acellular) Vaccine (Adacel) 0.5 milliLiter(s) IntraMuscular once  heparin   Injectable 5000 Unit(s) SubCutaneous every 12 hours  ibuprofen  Tablet. 600 milliGRAM(s) Oral every 6 hours  lactated ringers Bolus 1000 milliLiter(s) IV Bolus once  lactated ringers. 1000 milliLiter(s) (200 mL/Hr) IV Continuous <Continuous>  oxytocin Infusion 42 milliUNIT(s)/Min (42 mL/Hr) IV Continuous <Continuous>      MEDICATIONS  (PRN):  dexAMETHasone  Injectable 4 milliGRAM(s) IV Push every 6 hours PRN Nausea  diphenhydrAMINE 25 milliGRAM(s) Oral every 6 hours PRN Pruritus  lanolin Ointment 1 Application(s) Topical every 6 hours PRN Sore Nipples  magnesium hydroxide Suspension 30 milliLiter(s) Oral two times a day PRN Constipation  nalbuphine Injectable 2.5 milliGRAM(s) IV Push every 6 hours PRN Pruritus  naloxone Injectable 0.1 milliGRAM(s) IV Push every 3 minutes PRN For ANY of the following changes in patient status:  A. Breaths Per Minute LESS THAN 10, B. Oxygen saturation LESS THAN 90%, C. Sedation score of 6 for Stop After: 4 Times  ondansetron Injectable 4 milliGRAM(s) IV Push every 6 hours PRN Nausea  oxyCODONE    IR 10 milliGRAM(s) Oral every 3 hours PRN Moderate Pain (4 - 6)  oxyCODONE    IR 5 milliGRAM(s) Oral every 3 hours PRN Moderate to Severe Pain (4-10)  oxyCODONE    IR 5 milliGRAM(s) Oral once PRN Moderate to Severe Pain (4-10)  simethicone 80 milliGRAM(s) Chew every 4 hours PRN Gas      Labs:  Blood type: O Positive  Rubella IgG: RPR: Negative                          10.4[L]   14.78[H] >-----------< 194    ( 07-16 @ 06:43 )             30.9[L]                        11.4[L]   15.25[H] >-----------< 198    ( 07-15 @ 22:43 )             34.4[L]                  PE:  General: NAD  Abdomen: Soft, appropriately tender, incision c/d/i.  Extremities: No erythema, no pitting edema    
S: 35yo POD#1 s/p elective pLTCS for h/o shoulder and concurrent bilateral salpingectomy. Her pain is well controlled. She is tolerating a regular diet though not yet passing flatus. Denies N/V. Denies CP/SOB/lightheadedness/dizziness.   She is ambulating without difficulty.   Voiding spontaneously.     O:   Vital Signs Last 24 Hrs  T(C): 36.6 (16 Jul 2025 01:53), Max: 37 (16 Jul 2025 00:15)  T(F): 97.8 (16 Jul 2025 01:53), Max: 98.6 (16 Jul 2025 00:15)  HR: 74 (16 Jul 2025 01:53) (73 - 102)  BP: 105/67 (16 Jul 2025 01:53) (98/53 - 130/62)  BP(mean): 75 (16 Jul 2025 00:15) (73 - 89)  RR: 18 (16 Jul 2025 01:53) (16 - 18)  SpO2: 96% (16 Jul 2025 01:53) (85% - 100%)    Parameters below as of 16 Jul 2025 01:53  Patient On (Oxygen Delivery Method): room air        Labs:  Blood type: O Positive  Rubella IgG: RPR: Negative                          11.4[L]   15.25[H] >-----------< 198    ( 07-15 @ 22:43 )             34.4[L]                  PE:  General: NAD  Abdomen: Mildly distended, appropriately tender, incision c/d/i.  Extremities: No erythema, no pitting edema

## 2025-07-18 NOTE — PROGRESS NOTE ADULT - ATTENDING COMMENTS
33 yo POD#3 s/p PCS. Patient doing well and meeting all milestones. Plan for discharge home with routine outpatient follow up.

## 2025-07-22 ENCOUNTER — NON-APPOINTMENT (OUTPATIENT)
Age: 35
End: 2025-07-22

## 2025-07-23 ENCOUNTER — NON-APPOINTMENT (OUTPATIENT)
Age: 35
End: 2025-07-23

## 2025-07-30 PROBLEM — Z87.59 PERSONAL HISTORY OF OTHER COMPLICATIONS OF PREGNANCY, CHILDBIRTH AND THE PUERPERIUM: Chronic | Status: ACTIVE | Noted: 2025-07-09

## 2025-07-30 PROBLEM — M79.18 MYALGIA, OTHER SITE: Chronic | Status: ACTIVE | Noted: 2025-07-09

## 2025-07-30 PROBLEM — Z34.90 ENCOUNTER FOR SUPERVISION OF NORMAL PREGNANCY, UNSPECIFIED, UNSPECIFIED TRIMESTER: Chronic | Status: ACTIVE | Noted: 2025-07-09

## 2025-08-19 ENCOUNTER — NON-APPOINTMENT (OUTPATIENT)
Age: 35
End: 2025-08-19

## 2025-08-19 ENCOUNTER — APPOINTMENT (OUTPATIENT)
Dept: INTERNAL MEDICINE | Facility: CLINIC | Age: 35
End: 2025-08-19
Payer: COMMERCIAL

## 2025-08-19 VITALS
HEART RATE: 67 BPM | WEIGHT: 157 LBS | DIASTOLIC BLOOD PRESSURE: 74 MMHG | BODY MASS INDEX: 29.64 KG/M2 | OXYGEN SATURATION: 98 % | SYSTOLIC BLOOD PRESSURE: 115 MMHG | HEIGHT: 61 IN

## 2025-08-19 DIAGNOSIS — Z00.00 ENCOUNTER FOR GENERAL ADULT MEDICAL EXAMINATION W/OUT ABNORMAL FINDINGS: ICD-10-CM

## 2025-08-19 PROCEDURE — 36415 COLL VENOUS BLD VENIPUNCTURE: CPT

## 2025-08-19 PROCEDURE — 99395 PREV VISIT EST AGE 18-39: CPT

## 2025-08-26 ENCOUNTER — NON-APPOINTMENT (OUTPATIENT)
Age: 35
End: 2025-08-26

## 2025-08-26 LAB
25(OH)D3 SERPL-MCNC: 50.9 NG/ML
ALBUMIN SERPL ELPH-MCNC: 4.5 G/DL
ALP BLD-CCNC: 84 U/L
ALT SERPL-CCNC: 39 U/L
ANION GAP SERPL CALC-SCNC: 13 MMOL/L
AST SERPL-CCNC: 31 U/L
BASOPHILS # BLD AUTO: 0.05 K/UL
BASOPHILS NFR BLD AUTO: 0.8 %
BILIRUB SERPL-MCNC: 0.3 MG/DL
BUN SERPL-MCNC: 14 MG/DL
CALCIUM SERPL-MCNC: 9.8 MG/DL
CHLORIDE SERPL-SCNC: 99 MMOL/L
CHOLEST SERPL-MCNC: 229 MG/DL
CO2 SERPL-SCNC: 24 MMOL/L
CREAT SERPL-MCNC: 0.56 MG/DL
EGFRCR SERPLBLD CKD-EPI 2021: 121 ML/MIN/1.73M2
EOSINOPHIL # BLD AUTO: 0.38 K/UL
EOSINOPHIL NFR BLD AUTO: 5.9 %
ESTIMATED AVERAGE GLUCOSE: 117 MG/DL
GLUCOSE SERPL-MCNC: 91 MG/DL
HBA1C MFR BLD HPLC: 5.7 %
HCT VFR BLD CALC: 39.7 %
HDLC SERPL-MCNC: 68 MG/DL
HGB BLD-MCNC: 12.9 G/DL
IMM GRANULOCYTES NFR BLD AUTO: 0.3 %
LDLC SERPL-MCNC: 148 MG/DL
LYMPHOCYTES # BLD AUTO: 2.04 K/UL
LYMPHOCYTES NFR BLD AUTO: 31.8 %
MAN DIFF?: NORMAL
MCHC RBC-ENTMCNC: 28.2 PG
MCHC RBC-ENTMCNC: 32.5 G/DL
MCV RBC AUTO: 86.9 FL
MONOCYTES # BLD AUTO: 0.59 K/UL
MONOCYTES NFR BLD AUTO: 9.2 %
NEUTROPHILS # BLD AUTO: 3.33 K/UL
NEUTROPHILS NFR BLD AUTO: 52 %
NONHDLC SERPL-MCNC: 161 MG/DL
PLATELET # BLD AUTO: 300 K/UL
POTASSIUM SERPL-SCNC: 4.3 MMOL/L
PROT SERPL-MCNC: 7.4 G/DL
RBC # BLD: 4.57 M/UL
RBC # FLD: 13.8 %
SODIUM SERPL-SCNC: 136 MMOL/L
TRIGL SERPL-MCNC: 75 MG/DL
TSH SERPL-ACNC: 1.17 UIU/ML
WBC # FLD AUTO: 6.41 K/UL